# Patient Record
Sex: FEMALE | Race: ASIAN | NOT HISPANIC OR LATINO | URBAN - METROPOLITAN AREA
[De-identification: names, ages, dates, MRNs, and addresses within clinical notes are randomized per-mention and may not be internally consistent; named-entity substitution may affect disease eponyms.]

---

## 2019-11-18 ENCOUNTER — EMERGENCY (EMERGENCY)
Facility: HOSPITAL | Age: 84
LOS: 0 days | Discharge: HOME | End: 2019-11-18
Attending: EMERGENCY MEDICINE
Payer: MEDICAID

## 2019-11-18 VITALS
SYSTOLIC BLOOD PRESSURE: 166 MMHG | RESPIRATION RATE: 16 BRPM | TEMPERATURE: 98 F | HEART RATE: 72 BPM | DIASTOLIC BLOOD PRESSURE: 72 MMHG | OXYGEN SATURATION: 98 %

## 2019-11-18 VITALS — WEIGHT: 149.91 LBS | HEIGHT: 63 IN

## 2019-11-18 DIAGNOSIS — Z23 ENCOUNTER FOR IMMUNIZATION: ICD-10-CM

## 2019-11-18 DIAGNOSIS — Z79.01 LONG TERM (CURRENT) USE OF ANTICOAGULANTS: ICD-10-CM
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
## 2019-11-18 DIAGNOSIS — I65.29 OCCLUSION AND STENOSIS OF UNSPECIFIED CAROTID ARTERY: ICD-10-CM

## 2019-11-18 DIAGNOSIS — I10 ESSENTIAL (PRIMARY) HYPERTENSION: ICD-10-CM

## 2019-11-18 DIAGNOSIS — S01.81XA LACERATION WITHOUT FOREIGN BODY OF OTHER PART OF HEAD, INITIAL ENCOUNTER: ICD-10-CM
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
## 2019-11-18 DIAGNOSIS — S02.2XXA FRACTURE OF NASAL BONES, INITIAL ENCOUNTER FOR CLOSED FRACTURE: ICD-10-CM
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
## 2019-11-18 PROCEDURE — 76376 3D RENDER W/INTRP POSTPROCES: CPT | Mod: 26

## 2019-11-18 PROCEDURE — 12013 RPR F/E/E/N/L/M 2.6-5.0 CM: CPT

## 2019-11-18 PROCEDURE — 70486 CT MAXILLOFACIAL W/O DYE: CPT | Mod: 26

## 2019-11-18 PROCEDURE — 99284 EMERGENCY DEPT VISIT MOD MDM: CPT

## 2019-11-18 PROCEDURE — 93005 ELECTROCARDIOGRAM TRACING: CPT

## 2019-11-18 PROCEDURE — 99284 EMERGENCY DEPT VISIT MOD MDM: CPT | Mod: 25

## 2019-11-18 PROCEDURE — 70450 CT HEAD/BRAIN W/O DYE: CPT

## 2019-11-18 PROCEDURE — 93010 ELECTROCARDIOGRAM REPORT: CPT

## 2019-11-18 PROCEDURE — 70450 CT HEAD/BRAIN W/O DYE: CPT | Mod: 26

## 2019-11-18 PROCEDURE — 90471 IMMUNIZATION ADMIN: CPT

## 2019-11-18 PROCEDURE — 90715 TDAP VACCINE 7 YRS/> IM: CPT

## 2019-11-18 PROCEDURE — 76376 3D RENDER W/INTRP POSTPROCES: CPT

## 2019-11-18 PROCEDURE — 70486 CT MAXILLOFACIAL W/O DYE: CPT

## 2019-11-18 RX ORDER — ACETAMINOPHEN 500 MG
650 TABLET ORAL ONCE
Refills: 0 | Status: COMPLETED | OUTPATIENT
Start: 2019-11-18 | End: 2019-11-18

## 2019-11-18 RX ORDER — TETANUS TOXOID, REDUCED DIPHTHERIA TOXOID AND ACELLULAR PERTUSSIS VACCINE, ADSORBED 5; 2.5; 8; 8; 2.5 [IU]/.5ML; [IU]/.5ML; UG/.5ML; UG/.5ML; UG/.5ML
0.5 SUSPENSION INTRAMUSCULAR ONCE
Refills: 0 | Status: COMPLETED | OUTPATIENT
Start: 2019-11-18 | End: 2019-11-18

## 2019-11-18 RX ADMIN — Medication 650 MILLIGRAM(S): at 11:03

## 2019-11-18 RX ADMIN — TETANUS TOXOID, REDUCED DIPHTHERIA TOXOID AND ACELLULAR PERTUSSIS VACCINE, ADSORBED 0.5 MILLILITER(S): 5; 2.5; 8; 8; 2.5 SUSPENSION INTRAMUSCULAR at 11:03

## 2019-11-18 NOTE — ED PROVIDER NOTE - OBJECTIVE STATEMENT
83 y/o F with a PMHx of HTN, carotid artery stenosis presents to the ED s/p fall today. Pt was walking down the stairs when she lost her balance and fell down approximately 6 stairs. Pt hit her head. Pt now in ED with a laceration to her forehead, head pain, and nose pain. Denies LOC, leg pain, hip pain, or back pain. Pt is anticoagulated on Eliquis.

## 2019-11-18 NOTE — ED ADULT TRIAGE NOTE - CHIEF COMPLAINT QUOTE
Patient comes in with fall downstairs on eliquis. Patient has isolated head injury, forehead laceration and nose pain. Denies LOC. Patient denies pain in back/legs/hips. Neuro alert called. Patient brought straight to CT.

## 2019-11-18 NOTE — ED PROVIDER NOTE - CARE PLAN
Principal Discharge DX:	Head contusion  Secondary Diagnosis:	Facial laceration, initial encounter Principal Discharge DX:	Head contusion  Secondary Diagnosis:	Facial laceration, initial encounter  Secondary Diagnosis:	Nasal fracture

## 2019-11-18 NOTE — ED ADULT NURSE NOTE - OBJECTIVE STATEMENT
pt presents to ED s/p unwitnessed slip and fall down 5 stairs, + head trauma, - LOC, 2 lacerations to the forehead and 1 to the L. side nose. pt presents to ED s/p unwitnessed slip and fall down 5 stairs, + head trauma, - LOC, 2 lacerations to the forehead and 1 to the L. side nose. pt c/o HA, denies dizziness, SOB, and chest pain. Pt Aox3, breathing symmetrical and unlabored, family at bedside reports pt is at baseline mental status.

## 2019-11-18 NOTE — ED ADULT NURSE NOTE - NSIMPLEMENTINTERV_GEN_ALL_ED
Implemented All Fall with Harm Risk Interventions:  [place] to call system. Call bell, personal items and telephone within reach. Instruct patient to call for assistance. Room bathroom lighting operational. Non-slip footwear when patient is off stretcher. Physically safe environment: no spills, clutter or unnecessary equipment. Stretcher in lowest position, wheels locked, appropriate side rails in place. Provide visual cue, wrist band, yellow gown, etc. Monitor gait and stability. Monitor for mental status changes and reorient to person, place, and time. Review medications for side effects contributing to fall risk. Reinforce activity limits and safety measures with patient and family. Provide visual clues: red socks.

## 2019-11-18 NOTE — ED PROVIDER NOTE - PATIENT PORTAL LINK FT
You can access the FollowMyHealth Patient Portal offered by [facility] by registering at the following website: [email or web address]. By joining [employer]’s FollowMyHealth portal, you will also be able to view your health information using other applications (apps) compatible with our system.

## 2019-11-18 NOTE — ED PROVIDER NOTE - ENMT, MLM
Airway patent, Nasal mucosa clear. Mouth with normal mucosa. Throat has no vesicles, no oropharyngeal exudates and uvula is midline. Airway patent, Nasal mucosa clear. Mouth with normal mucosa. Throat has no vesicles, no oropharyngeal exudates and uvula is midline.  Nasal swelling/ecchymosis. No septal hematoma

## 2019-11-18 NOTE — ED PROVIDER NOTE - NSFOLLOWUPINSTRUCTIONS_ED_ALL_ED_FT
Head Injury, Adult    There are many types of head injuries. They can be as minor as a bump. Some head injuries can be worse. Worse injuries include:    A strong hit to the head that hurts the brain (concussion).  A bruise of the brain (contusion). This means there is bleeding in the brain that can cause swelling.  A cracked skull (skull fracture).  Bleeding in the brain that gathers, gets thick (makes a clot), and forms a bump (hematoma).    Most problems from a head injury come in the first 24 hours. However, you may still have side effects up to 7–10 days after your injury. It is important to watch your condition for any changes.     Follow these instructions at home:  Activity    Rest as much as possible.  Avoid activities that are hard or tiring.  Make sure you get enough sleep.  Limit activities that need a lot of thought or attention, such as:  Watching TV.  Playing memory games and puzzles.  Job-related work or homework.  Working on the computer, social media, and texting.  Avoid activities that could cause another head injury until your doctor says it is okay. This includes playing sports.  Ask your doctor when it is safe for you to go back to your normal activities, such as work or school. Ask your doctor for a step-by-step plan for slowly going back to your normal activities.  Ask your doctor when you can drive, ride a bicycle, or use heavy machinery. Never do these activities if you are dizzy.    Lifestyle    Do not drink alcohol until your doctor says it is okay.  Avoid drug use.  If it is harder than usual to remember things, write them down.  If you are easily distracted, try to do one thing at a time.  Talk with family members or close friends when making important decisions.  Tell your friends, family, a trusted coworker, and  about your injury, symptoms, and limits (restrictions). Have them watch for any problems that are new or getting worse.    General instructions    Take over-the-counter and prescription medicines only as told by your doctor.  Have someone stay with you for 24 hours after your head injury. This person should watch you for any changes in your symptoms and be ready to get help.  Keep all follow-up visits as told by your doctor. This is important.    How is this prevented?  Work on your balance and strength. This can help you avoid falls.  Wear a seatbelt when you are in a moving vehicle.  Wear a helmet when:  Riding a bicycle.  Skiing.  Doing any other sport or activity that has a risk of injury.  Drink alcohol only in moderation.  Make your home safer by:  Getting rid of clutter from the floors and stairs, like things that can make you trip.  Using grab bars in bathrooms and handrails by stairs.  Placing non-slip mats on floors and in bathtubs.  Putting more light in dim areas.    Get help right away if:  You have:  A very bad (severe) headache that is not helped by medicine.  Trouble walking or weakness in your arms and legs.  Clear or bloody fluid coming from your nose or ears.  Changes in your seeing (vision).  Jerky movements that you cannot control (seizure).  You throw up (vomit).  Your symptoms get worse.  You lose balance.  Your speech is slurred.  You pass out.  You are sleepier and have trouble staying awake.  The black centers of your eyes (pupils) change in size.    These symptoms may be an emergency. Do not wait to see if the symptoms will go away. Get medical help right away. Call your local emergency services (911 in the [place] Do not drive yourself to the hospital.    ADDITIONAL NOTES AND INSTRUCTIONS    Please follow up with your Primary MD in 24-48 hr.  Seek immediate medical care for any new/worsening signs or symptoms. Return to the ED or follow-up with your physician in ~7 days for suture removal.  Follow-up with Dr. Matos (Plastics) for your nasal bone fracture.    Stitches, Staples, or Adhesive Wound Closure    Doctors use stitches (sutures), staples, and certain glue (skin adhesives) to hold your skin together while it heals (wound closure). You may need this treatment after you have surgery or if you cut your skin accidentally. These methods help your skin heal more quickly. They also make it less likely that you will have a scar.    Sutures     Sutures are the oldest method of wound closure. Sutures can be made from natural or synthetic materials. They can be made from a material that your body can break down as your wound heals (absorbable), or they can be made from a material that needs to be removed from your skin (nonabsorbable). They come in many different strengths and sizes.    Your doctor attaches the sutures to a steel needle on one end. Sutures can be passed through your skin, or through the tissues beneath your skin. Then they are tied and cut. Your skin edges may be closed in one continuous stitch or in separate stitches.    Sutures are strong and can be used for all kinds of wounds. Absorbable sutures may be used to close tissues under the skin. The disadvantage of sutures is that they may cause skin reactions that lead to infection. Nonabsorbable sutures need to be removed.    How do I care for my wound closure?  Take medicines only as told by your doctor.  If you were prescribed an antibiotic medicine for your wound, finish it all even if you start to feel better.  Use ointments or creams only as told by your doctor.  Wash your hands with soap and water before and after touching your wound.  Do not soak your wound in water. Do not take baths, swim, or use a hot tub until your doctor says it is okay.  Ask your doctor when you can start showering. Cover your wound if told by your doctor.  Do not take out your own sutures or staples.  Do not pick at your wound. Picking can cause an infection.  Keep all follow-up visits as told by your doctor. This is important.    YOUR WOUND NEEDS FOLLOW UP FOR A WOUND CHECK, SUTURE REMOVAL OR STAPLE REMOVAL IN  7 DAYS    Contact your doctor if:    You have a fever.  You have chills.  You have redness, puffiness (swelling), or pain at the site of your wound.  You have fluid, blood, or pus coming from your wound.  There is a bad smell coming from your wound.  The skin edges of your wound start to separate after your sutures have been removed.  Your wound becomes thick, raised, and darker in color after your sutures come out (scarring).    This information is not intended to replace advice given to you by your health care provider. Make sure you discuss any questions you have with your health care provider.         Head Injury, Adult    There are many types of head injuries. They can be as minor as a bump. Some head injuries can be worse. Worse injuries include:    A strong hit to the head that hurts the brain (concussion).  A bruise of the brain (contusion). This means there is bleeding in the brain that can cause swelling.  A cracked skull (skull fracture).  Bleeding in the brain that gathers, gets thick (makes a clot), and forms a bump (hematoma).    Most problems from a head injury come in the first 24 hours. However, you may still have side effects up to 7–10 days after your injury. It is important to watch your condition for any changes.     Follow these instructions at home:  Activity    Rest as much as possible.  Avoid activities that are hard or tiring.  Make sure you get enough sleep.  Limit activities that need a lot of thought or attention, such as:  Watching TV.  Playing memory games and puzzles.  Job-related work or homework.  Working on the computer, social media, and texting.  Avoid activities that could cause another head injury until your doctor says it is okay. This includes playing sports.  Ask your doctor when it is safe for you to go back to your normal activities, such as work or school. Ask your doctor for a step-by-step plan for slowly going back to your normal activities.  Ask your doctor when you can drive, ride a bicycle, or use heavy machinery. Never do these activities if you are dizzy.    Lifestyle    Do not drink alcohol until your doctor says it is okay.  Avoid drug use.  If it is harder than usual to remember things, write them down.  If you are easily distracted, try to do one thing at a time.  Talk with family members or close friends when making important decisions.  Tell your friends, family, a trusted coworker, and  about your injury, symptoms, and limits (restrictions). Have them watch for any problems that are new or getting worse.    General instructions    Take over-the-counter and prescription medicines only as told by your doctor.  Have someone stay with you for 24 hours after your head injury. This person should watch you for any changes in your symptoms and be ready to get help.  Keep all follow-up visits as told by your doctor. This is important.    How is this prevented?  Work on your balance and strength. This can help you avoid falls.  Wear a seatbelt when you are in a moving vehicle.  Wear a helmet when:  Riding a bicycle.  Skiing.  Doing any other sport or activity that has a risk of injury.  Drink alcohol only in moderation.  Make your home safer by:  Getting rid of clutter from the floors and stairs, like things that can make you trip.  Using grab bars in bathrooms and handrails by stairs.  Placing non-slip mats on floors and in bathtubs.  Putting more light in dim areas.    Get help right away if:  You have:  A very bad (severe) headache that is not helped by medicine.  Trouble walking or weakness in your arms and legs.  Clear or bloody fluid coming from your nose or ears.  Changes in your seeing (vision).  Jerky movements that you cannot control (seizure).  You throw up (vomit).  Your symptoms get worse.  You lose balance.  Your speech is slurred.  You pass out.  You are sleepier and have trouble staying awake.  The black centers of your eyes (pupils) change in size.    These symptoms may be an emergency. Do not wait to see if the symptoms will go away. Get medical help right away. Call your local emergency services (911 in the [place] Do not drive yourself to the hospital.    ADDITIONAL NOTES AND INSTRUCTIONS    Please follow up with your Primary MD in 24-48 hr.  Seek immediate medical care for any new/worsening signs or symptoms.

## 2019-11-18 NOTE — ED PROVIDER NOTE - CARE PROVIDER_API CALL
Aniket Humphrey)  Plastic Surgery  [address], [place], [address], [place] [address]  Phone: [phone]  Fax: [phone]  Follow Up Time:

## 2022-07-15 ENCOUNTER — EMERGENCY (EMERGENCY)
Facility: HOSPITAL | Age: 87
LOS: 0 days | Discharge: HOME | End: 2022-07-15
Attending: EMERGENCY MEDICINE
Payer: MEDICAID

## 2022-07-15 VITALS
RESPIRATION RATE: 19 BRPM | SYSTOLIC BLOOD PRESSURE: 119 MMHG | TEMPERATURE: 98 F | OXYGEN SATURATION: 99 % | DIASTOLIC BLOOD PRESSURE: 77 MMHG | HEART RATE: 71 BPM

## 2022-07-15 VITALS — OXYGEN SATURATION: 96 % | HEART RATE: 77 BPM | HEIGHT: 63 IN | WEIGHT: 115.08 LBS

## 2022-07-15 DIAGNOSIS — I10 ESSENTIAL (PRIMARY) HYPERTENSION: ICD-10-CM

## 2022-07-15 DIAGNOSIS — U07.1 COVID-19: ICD-10-CM

## 2022-07-15 DIAGNOSIS — R06.2 WHEEZING: ICD-10-CM

## 2022-07-15 DIAGNOSIS — R06.02 SHORTNESS OF BREATH: ICD-10-CM

## 2022-07-15 DIAGNOSIS — R05.9 COUGH, UNSPECIFIED: ICD-10-CM

## 2022-07-15 PROCEDURE — 93005 ELECTROCARDIOGRAM TRACING: CPT

## 2022-07-15 PROCEDURE — 99283 EMERGENCY DEPT VISIT LOW MDM: CPT | Mod: 25

## 2022-07-15 PROCEDURE — 99284 EMERGENCY DEPT VISIT MOD MDM: CPT

## 2022-07-15 PROCEDURE — 93010 ELECTROCARDIOGRAM REPORT: CPT

## 2022-07-15 PROCEDURE — 71045 X-RAY EXAM CHEST 1 VIEW: CPT

## 2022-07-15 PROCEDURE — 71045 X-RAY EXAM CHEST 1 VIEW: CPT | Mod: 26

## 2022-07-15 RX ORDER — ACETAMINOPHEN 500 MG
650 TABLET ORAL ONCE
Refills: 0 | Status: COMPLETED | OUTPATIENT
Start: 2022-07-15 | End: 2022-07-15

## 2022-07-15 NOTE — ED ADULT NURSE NOTE - OBJECTIVE STATEMENT
Pt reports feeling short of breath and having wheezing at home  has reported being covid positive since a few days ago

## 2022-07-15 NOTE — ED PROVIDER NOTE - NSFOLLOWUPINSTRUCTIONS_ED_ALL_ED_FT
please follow up with your doctor in 2-3 days.   continue with medication as prescribed.   return to ED for any concerns please follow up with your doctor in 2-3 days.   continue with medication as prescribed.   take tylenol every 4 hours for pain.  drink plenty of fluids.  return to ED for any concerns

## 2022-07-15 NOTE — ED ADULT NURSE NOTE - PAIN RATING/NUMBER SCALE (0-10): ACTIVITY
7 Cheek-To-Nose Interpolation Flap Text: A decision was made to reconstruct the defect utilizing an interpolation axial flap and a staged reconstruction.  A telfa template was made of the defect.  This telfa template was then used to outline the Cheek-To-Nose Interpolation flap.  The donor area for the pedicle flap was then injected with anesthesia.  The flap was excised through the skin and subcutaneous tissue down to the layer of the underlying musculature.  The interpolation flap was carefully excised within this deep plane to maintain its blood supply.  The edges of the donor site were undermined.   The donor site was closed in a primary fashion.  The pedicle was then rotated into position and sutured.  Once the tube was sutured into place, adequate blood supply was confirmed with blanching and refill.  The pedicle was then wrapped with xeroform gauze and dressed appropriately with a telfa and gauze bandage to ensure continued blood supply and protect the attached pedicle.

## 2022-07-15 NOTE — ED PROVIDER NOTE - PROGRESS NOTE DETAILS
Vaccine status unknown pt and daughter in law told of results.   agree with plan for d/c home to continue with antiviral, tylenol for pain and f/u with PMD

## 2022-07-15 NOTE — ED PROVIDER NOTE - OBJECTIVE STATEMENT
88 y/o female in ED c/o cough with sob x 1 wk diagnosed with COVID and on antiviral meds.   states today with wheezing.   denies any fever, HA, cp, n/v/d/abd pain.

## 2022-07-15 NOTE — ED ADULT TRIAGE NOTE - CHIEF COMPLAINT QUOTE
Patient in the beginning stage of dementia. Pt. presenting to the ER accompanied by her daughter-in-law with c/o wheezing. As per Lasha (daughter-in-law) "patient tested positive for COVID last Friday at urgent care and was placed on antivirals. On Thursday she began wheezing. This morning the wheezing got worse so we brought her in to be evaluated. Complains of pain in her head and chest." Respirations even and unlabored, air way patent, O2 saturation 96% on room air, heart rate 77, no visible signs of distress noted. Brought to the room for EKG.

## 2022-07-15 NOTE — ED PROVIDER NOTE - PATIENT PORTAL LINK FT
You can access the FollowMyHealth Patient Portal offered by [facility] by registering at the following website: [email or web address]. By joining [employer]’s FollowMyHealth portal, you will also be able to view your health information using other applications (apps) compatible with our system.

## 2022-07-15 NOTE — ED PROVIDER NOTE - CLINICAL SUMMARY MEDICAL DECISION MAKING FREE TEXT BOX
pt with covid on antiviral c/o wheezing today.   lungs CTA.   no distress.   will XR, med and have f/u

## 2023-04-01 ENCOUNTER — EMERGENCY (EMERGENCY)
Facility: HOSPITAL | Age: 88
LOS: 0 days | Discharge: HOME | End: 2023-04-01
Attending: EMERGENCY MEDICINE
Payer: MEDICAID

## 2023-04-01 VITALS
SYSTOLIC BLOOD PRESSURE: 177 MMHG | RESPIRATION RATE: 19 BRPM | DIASTOLIC BLOOD PRESSURE: 56 MMHG | OXYGEN SATURATION: 100 % | HEART RATE: 80 BPM | TEMPERATURE: 99 F

## 2023-04-01 VITALS — WEIGHT: 108.03 LBS | HEIGHT: 55 IN
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
## 2023-04-01 DIAGNOSIS — S61.213A LACERATION WITHOUT FOREIGN BODY OF LEFT MIDDLE FINGER WITHOUT DAMAGE TO NAIL, INITIAL ENCOUNTER: ICD-10-CM
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
## 2023-04-01 DIAGNOSIS — Z86.79 PERSONAL HISTORY OF OTHER DISEASES OF THE CIRCULATORY SYSTEM: ICD-10-CM

## 2023-04-01 DIAGNOSIS — I10 ESSENTIAL (PRIMARY) HYPERTENSION: ICD-10-CM

## 2023-04-01 DIAGNOSIS — Z79.02 LONG TERM (CURRENT) USE OF ANTITHROMBOTICS/ANTIPLATELETS: ICD-10-CM

## 2023-04-01 PROCEDURE — 99282 EMERGENCY DEPT VISIT SF MDM: CPT

## 2023-04-01 PROCEDURE — 99284 EMERGENCY DEPT VISIT MOD MDM: CPT

## 2023-04-01 RX ORDER — CEPHALEXIN 500 MG
1 CAPSULE ORAL
Qty: 10 | Refills: 0
Start: 2023-04-01 | End: 2023-04-05

## 2023-04-01 RX ORDER — ALBUTEROL 90 UG/1
3 AEROSOL, METERED ORAL
Qty: 9 | Refills: 0
Start: 2023-04-01 | End: 2023-04-05

## 2023-04-01 NOTE — ED ADULT NURSE NOTE - NS ED NURSE DC INFO COMPLEXITY
Simple: Patient demonstrates quick and easy understanding/Verbalized Understanding
syncope, chest pressure

## 2023-04-01 NOTE — ED STATDOCS - NS ED ATTENDING STATEMENT MOD
Attending Only This was a shared visit with the RENZO. I reviewed and verified the documentation and independently performed the documented:

## 2023-04-01 NOTE — ED STATDOCS - PROGRESS NOTE DETAILS
patient seen and evaluated with ED attending at intake.  bleeding controlled.  wound care reviewed -Shemar Shaw PA-C

## 2023-04-01 NOTE — ED STATDOCS - ATTENDING APP SHARED VISIT CONTRIBUTION OF CARE
Dr. Laird: I performed a face to face bedside interview with patient regarding history of present illness, review of symptoms and past medical history. I completed an independent physical exam.  I have discussed patient's plan of care with PA.   I agree with note as stated above, having amended the EMR as needed to reflect my findings.   This includes HISTORY OF PRESENT ILLNESS, HIV, PAST MEDICAL/SURGICAL/FAMILY/SOCIAL HISTORY, ALLERGIES AND HOME MEDICATIONS, REVIEW OF SYSTEMS, PHYSICAL EXAM, and any PROGRESS NOTES during the time I functioned as the attending physician for this patient.  KACY Laird DO

## 2023-04-01 NOTE — ED STATDOCS - CLINICAL SUMMARY MEDICAL DECISION MAKING FREE TEXT BOX
pt with avulsion of finger stillbleeding will cleanse, apply silver nitrate to stop bleeding and drethan d/c

## 2023-04-01 NOTE — ED STATDOCS - NSFOLLOWUPINSTRUCTIONS_ED_ALL_ED_FT
Deep Skin Avulsion  A deep skin avulsion is a type of open wound. It often results from a severe injury (trauma) that tears away all layers of the skin or an entire body part. The areas of the body that are most often affected include the face, lips, ears, nose, and fingers.    A deep skin avulsion may make structures below the skin become visible. You may be able to see muscle, bone, nerves, and blood vessels. A deep skin avulsion can also damage important structures beneath the skin. These include bones, tendons, nerves, or blood vessels.    What are the causes?  This condition may be caused by an injury, such as:  Being crushed.  Falling against a jagged surface.  An animal bite.  A gunshot wound.  A severe burn.  Being dragged, such as in a bicycle or motorcycle accident.  What are the signs or symptoms?  Symptoms of this condition include:  Pain.  Numbness.  Swelling.  Bleeding, which may be heavy.  How is this diagnosed?  This condition may be diagnosed with a medical history and physical exam. You may also have X-rays done.    How is this treated?  Treatment for this condition depends on how large and deep the wound is and where it is located. Treatment usually starts with:  Stopping the bleeding.  Washing out the wound with a germ-free (sterile) solution.  After initial treatment, the wound may be closed or left open to heal.  Wounds that are small and clean may be closed with stitches (sutures).  Wounds that cannot be closed with sutures may be covered with a piece of skin (graft) or your own skin flap.  Wounds that are hard to close or that may become infected may be left open. These wounds heal over time.  You may also be treated with medicine, such as:  Antibiotic medicine.  Pain medicine.  Tetanus shot.  Follow these instructions at home:  Medicines    If you were prescribed an antibiotic medicine, take or apply it as told by your health care provider. Do not stop taking or using the antibiotic even if your condition improves.  Take over-the-counter and prescription medicines only as told by your health care provider.  If you were prescribed a pain medicine, take it 30 minutes or more before you do any wound care, or as told by your health care provider.  Ask your health care provider if the medicine prescribed to you requires you to avoid driving or using machinery.  Wound care      Follow instructions from your health care provider about how to take care of your wound. Make sure you:  Wash your hands with soap and water for at least 20 seconds before and after you change your bandage (dressing). If soap and water are not available, use hand .  Change your dressing as told by your health care provider.  Leave sutures, skin glue, or adhesive strips in place. These skin closures may need to stay in place for 2 weeks or longer. If adhesive strip edges start to loosen and curl up, you may trim the loose edges. Do not remove adhesive strips completely unless your health care provider tells you to do that.  Clean the wound each day, or as told by your health care provider:  Wash the wound with mild soap and water.  Rinse the wound with water to remove all soap.  Pat the wound dry with a clean towel. Do not rub it.  Cover the wound with a clean dressing.  Keep your dressing clean and dry. Do not take baths, swim, use a hot tub, or do anything that would put your wound under water until your health care provider approves.  Check your wound every day for signs of infection. Check for:  More redness, swelling, or pain.  More fluid or blood.  Warmth.  Pus or a bad smell.  Do not scratch or pick at the wound.  General instructions    Raise (elevate) the injured area above the level of your heart while you are sitting or lying down.  Do not use any products that contain nicotine or tobacco, such as cigarettes, e-cigarettes, and chewing tobacco. These may delay wound healing. If you need help quitting, ask your health care provider.  Eat a healthy diet to help your wound heal. This includes eating foods rich in protein, vitamin A, and vitamin C.  Keep all follow-up visits. This is important.  Contact a health care provider if:  You have pain that does not get better with medicine.  You have any of these signs of infection:  More redness, swelling, or pain around your wound.  More fluid or blood coming from your wound.  A fever.  You got a tetanus shot and you have swelling, severe pain, redness, or bleeding at the injection site.  You are nauseous or you vomit.  You notice something coming out of the wound, such as wood or glass.  Get help right away if:  You have a red streak going away from your wound.  A wound that was closed breaks open.  The wound is bleeding, and the bleeding does not stop with gentle pressure.  You have trouble breathing.  The wound is on your hand or foot and:  You cannot properly move a finger or toe.  Your fingers or toes look pale or bluish.  Summary  A deep skin avulsion is a type of injury that can damage important structures beneath the skin.  A wound may be closed or left open to heal. This depends on the size and location of the wound and whether it is likely to become infected.  Follow instructions from your health care provider about how to take care of your wound.  Contact your health care provider if you have increased redness, swelling, or pain around your wound, or if your pain is not controlled with medicine.  This information is not intended to replace advice given to you by your health care provider. Make sure you discuss any questions you have with your health care provider.

## 2023-04-01 NOTE — ED ADULT TRIAGE NOTE - CHIEF COMPLAINT QUOTE
pt presents to ED c/o finger laceration. pt daughter states pt cut finger on Thursday with knife. was seen at urgent care and wrapped in gauze. today went back to urgent care to have dressing loosened up because it was "too tight" and cut started bleeding again. active bleeding noted in triage. pt takes daily  Plavix

## 2023-04-01 NOTE — ED PROCEDURE NOTE - GENERAL PROCEDURE DETAILS
silver nitrate utilized to control bleeding to finger tip, quick clot and pressure dressing subsequently applied

## 2023-04-01 NOTE — ED STATDOCS - PATIENT PORTAL LINK FT
You can access the FollowMyHealth Patient Portal offered by [facility] by registering at the following website: [email or web address]. By joining [facility]’s FollowMyHealth portal, you will also be able to view your health information using other applications (apps) compatible with our system.

## 2023-04-01 NOTE — ED STATDOCS - OBJECTIVE STATEMENT
87 yo female bib daughter for left middle finger laceration from cutting an onion that was seen at urgent care and dressed. the dressing is saturated with blood and laceration is still bleeding, pt is on plavix

## 2023-04-01 NOTE — ED STATDOCS - PHYSICAL EXAMINATION
Gen:  Well appearning in NAD  Head:  NC/AT  Resp: No distress   Ext: no deformities  Skin: warm and dry as visualized, left middle finger, distal volar, radial side with approx 1cm  avulsion with scant oozing from. sensation intact  psych:  aao x 4,

## 2023-04-20 ENCOUNTER — INPATIENT (INPATIENT)
Facility: HOSPITAL | Age: 88
LOS: 1 days | Discharge: HOME | DRG: 194 | End: 2023-04-22
Attending: HOSPITALIST | Admitting: INTERNAL MEDICINE
Payer: MEDICAID

## 2023-04-20 VITALS
DIASTOLIC BLOOD PRESSURE: 51 MMHG | OXYGEN SATURATION: 97 % | WEIGHT: 108.03 LBS | SYSTOLIC BLOOD PRESSURE: 146 MMHG | RESPIRATION RATE: 17 BRPM | TEMPERATURE: 98 F | HEART RATE: 64 BPM | HEIGHT: 55 IN

## 2023-04-20 DIAGNOSIS — I50.9 HEART FAILURE, UNSPECIFIED: ICD-10-CM

## 2023-04-20 LAB
ALBUMIN SERPL ELPH-MCNC: 3.5 G/DL — SIGNIFICANT CHANGE UP (ref 3.3–5)
ALP SERPL-CCNC: 76 U/L — SIGNIFICANT CHANGE UP (ref 40–120)
ALT FLD-CCNC: 32 U/L — SIGNIFICANT CHANGE UP (ref 12–78)
ANION GAP SERPL CALC-SCNC: 6 MMOL/L — SIGNIFICANT CHANGE UP (ref 5–17)
APPEARANCE UR: CLEAR — SIGNIFICANT CHANGE UP
APTT BLD: 24.6 SEC — LOW (ref 27.5–35.5)
AST SERPL-CCNC: 39 U/L — HIGH (ref 15–37)
BASOPHILS # BLD AUTO: 0.02 K/UL — SIGNIFICANT CHANGE UP (ref 0–0.2)
BASOPHILS NFR BLD AUTO: 0.2 % — SIGNIFICANT CHANGE UP (ref 0–2)
BILIRUB SERPL-MCNC: 0.4 MG/DL — SIGNIFICANT CHANGE UP (ref 0.2–1.2)
BILIRUB UR-MCNC: NEGATIVE — SIGNIFICANT CHANGE UP
BUN SERPL-MCNC: 81 MG/DL — HIGH (ref 7–23)
CALCIUM SERPL-MCNC: 8.7 MG/DL — SIGNIFICANT CHANGE UP (ref 8.5–10.1)
CHLORIDE SERPL-SCNC: 99 MMOL/L — SIGNIFICANT CHANGE UP (ref 96–108)
CO2 SERPL-SCNC: 22 MMOL/L — SIGNIFICANT CHANGE UP (ref 22–31)
COLOR SPEC: YELLOW — SIGNIFICANT CHANGE UP
CREAT SERPL-MCNC: 1.95 MG/DL — HIGH (ref 0.5–1.3)
DIFF PNL FLD: ABNORMAL
EGFR: 24 ML/MIN/1.73M2 — LOW
EOSINOPHIL # BLD AUTO: 0.02 K/UL — SIGNIFICANT CHANGE UP (ref 0–0.5)
EOSINOPHIL NFR BLD AUTO: 0.2 % — SIGNIFICANT CHANGE UP (ref 0–6)
ERYTHROCYTE [SEDIMENTATION RATE] IN BLOOD: 72 MM/HR — HIGH (ref 0–20)
GLUCOSE SERPL-MCNC: 115 MG/DL — HIGH (ref 70–99)
GLUCOSE UR QL: NEGATIVE — SIGNIFICANT CHANGE UP
HCT VFR BLD CALC: 24.8 % — LOW (ref 34.5–45)
HGB BLD-MCNC: 8.2 G/DL — LOW (ref 11.5–15.5)
IMM GRANULOCYTES NFR BLD AUTO: 0.4 % — SIGNIFICANT CHANGE UP (ref 0–0.9)
INR BLD: 1.03 RATIO — SIGNIFICANT CHANGE UP (ref 0.88–1.16)
KETONES UR-MCNC: NEGATIVE — SIGNIFICANT CHANGE UP
LACTATE SERPL-SCNC: 1 MMOL/L — SIGNIFICANT CHANGE UP (ref 0.7–2)
LEUKOCYTE ESTERASE UR-ACNC: ABNORMAL
LYMPHOCYTES # BLD AUTO: 1.05 K/UL — SIGNIFICANT CHANGE UP (ref 1–3.3)
LYMPHOCYTES # BLD AUTO: 10.1 % — LOW (ref 13–44)
MAGNESIUM SERPL-MCNC: 2.2 MG/DL — SIGNIFICANT CHANGE UP (ref 1.6–2.6)
MCHC RBC-ENTMCNC: 26.5 PG — LOW (ref 27–34)
MCHC RBC-ENTMCNC: 33.1 GM/DL — SIGNIFICANT CHANGE UP (ref 32–36)
MCV RBC AUTO: 80.3 FL — SIGNIFICANT CHANGE UP (ref 80–100)
MONOCYTES # BLD AUTO: 0.8 K/UL — SIGNIFICANT CHANGE UP (ref 0–0.9)
MONOCYTES NFR BLD AUTO: 7.7 % — SIGNIFICANT CHANGE UP (ref 2–14)
NEUTROPHILS # BLD AUTO: 8.49 K/UL — HIGH (ref 1.8–7.4)
NEUTROPHILS NFR BLD AUTO: 81.4 % — HIGH (ref 43–77)
NITRITE UR-MCNC: NEGATIVE — SIGNIFICANT CHANGE UP
NT-PROBNP SERPL-SCNC: HIGH PG/ML (ref 0–450)
PH UR: 5 — SIGNIFICANT CHANGE UP (ref 5–8)
PLATELET # BLD AUTO: 233 K/UL — SIGNIFICANT CHANGE UP (ref 150–400)
POTASSIUM SERPL-MCNC: 5.1 MMOL/L — SIGNIFICANT CHANGE UP (ref 3.5–5.3)
POTASSIUM SERPL-SCNC: 5.1 MMOL/L — SIGNIFICANT CHANGE UP (ref 3.5–5.3)
PROT SERPL-MCNC: 8.3 GM/DL — SIGNIFICANT CHANGE UP (ref 6–8.3)
PROT UR-MCNC: 15
PROTHROM AB SERPL-ACNC: 11.9 SEC — SIGNIFICANT CHANGE UP (ref 10.5–13.4)
RAPID RVP RESULT: SIGNIFICANT CHANGE UP
RBC # BLD: 3.09 M/UL — LOW (ref 3.8–5.2)
RBC # FLD: 14.8 % — HIGH (ref 10.3–14.5)
SARS-COV-2 RNA SPEC QL NAA+PROBE: SIGNIFICANT CHANGE UP
SODIUM SERPL-SCNC: 127 MMOL/L — LOW (ref 135–145)
SP GR SPEC: 1.01 — SIGNIFICANT CHANGE UP (ref 1.01–1.02)
TROPONIN I, HIGH SENSITIVITY RESULT: 35.84 NG/L — SIGNIFICANT CHANGE UP
UROBILINOGEN FLD QL: NEGATIVE — SIGNIFICANT CHANGE UP
WBC # BLD: 10.42 K/UL — SIGNIFICANT CHANGE UP (ref 3.8–10.5)
WBC # FLD AUTO: 10.42 K/UL — SIGNIFICANT CHANGE UP (ref 3.8–10.5)

## 2023-04-20 PROCEDURE — 36415 COLL VENOUS BLD VENIPUNCTURE: CPT

## 2023-04-20 PROCEDURE — 99285 EMERGENCY DEPT VISIT HI MDM: CPT

## 2023-04-20 PROCEDURE — 86850 RBC ANTIBODY SCREEN: CPT

## 2023-04-20 PROCEDURE — 86900 BLOOD TYPING SEROLOGIC ABO: CPT

## 2023-04-20 PROCEDURE — 71045 X-RAY EXAM CHEST 1 VIEW: CPT | Mod: 26

## 2023-04-20 PROCEDURE — 93010 ELECTROCARDIOGRAM REPORT: CPT

## 2023-04-20 PROCEDURE — 85027 COMPLETE CBC AUTOMATED: CPT

## 2023-04-20 PROCEDURE — 86901 BLOOD TYPING SEROLOGIC RH(D): CPT

## 2023-04-20 PROCEDURE — 73130 X-RAY EXAM OF HAND: CPT | Mod: 26,LT

## 2023-04-20 PROCEDURE — 80048 BASIC METABOLIC PNL TOTAL CA: CPT

## 2023-04-20 PROCEDURE — 85652 RBC SED RATE AUTOMATED: CPT

## 2023-04-20 RX ORDER — FUROSEMIDE 40 MG
20 TABLET ORAL ONCE
Refills: 0 | Status: COMPLETED | OUTPATIENT
Start: 2023-04-20 | End: 2023-04-20

## 2023-04-20 RX ADMIN — Medication 20 MILLIGRAM(S): at 22:11

## 2023-04-20 NOTE — CONSULT NOTE ADULT - SUBJECTIVE AND OBJECTIVE BOX
88y Female RHD presents with necrosis of the LMF distal phalanx. Patient accompanied by daughter who assisted with interview. She states pt cut her finger on  while chopping onions. They went to urgent care after because the cut kept bleeding since the patient is on plavix. At urgent care they wrapped her finger tightly to stop the bleeding, however, the following day she noticed it was purple so she went back to urgent care on  where they undressed the finger and wrapped it more loosely. Afterwards the finger kept bleeding so she came to  ED for evaluation. Notably the  ED visit is documented on , however daughter states she was here on . The ED cauterized the finger with silver nitrate and patient was discharged. Several days ago the daughter noticed the finger was turning black so she came back to ED for further evaluation. Patient has minimal pain and states there is no sensation in the fingertip of the affected finger.       PAST MEDICAL & SURGICAL HISTORY:  HTN (hypertension)      History of carotid stenosis        Home Medications:    Allergies    Allergy Status Unknown    Intolerances                              8.2    10.42 )-----------( 233      ( 2023 21:00 )             24.8     04-20    127<L>  |  99  |  81<H>  ----------------------------<  115<H>  5.1   |  22  |  1.95<H>    Ca    8.7      2023 21:00  Mg     2.2     04-20    TPro  8.3  /  Alb  3.5  /  TBili  0.4  /  DBili  x   /  AST  39<H>  /  ALT  32  /  AlkPhos  76  04-20    PT/INR - ( 2023 21:00 )   PT: 11.9 sec;   INR: 1.03 ratio         PTT - ( 2023 21:00 )  PTT:24.6 sec  Urinalysis Basic - ( 2023 21:55 )    Color: Yellow / Appearance: Clear / S.010 / pH: x  Gluc: x / Ketone: Negative  / Bili: Negative / Urobili: Negative   Blood: x / Protein: 15 / Nitrite: Negative   Leuk Esterase: Small / RBC: 3-5 /HPF / WBC 26-50 /HPF   Sq Epi: x / Non Sq Epi: x / Bacteria: Occasional          Vital Signs Last 24 Hrs  T(C): 36.8 (2023 19:56), Max: 36.8 (2023 19:56)  T(F): 98.2 (2023 19:56), Max: 98.2 (2023 19:56)  HR: 64 (2023 19:56) (64 - 64)  BP: 146/51 (2023 19:56) (146/51 - 146/51)  BP(mean): 103 (2023 19:56) (103 - 103)  RR: 17 (2023 19:56) (17 - 17)  SpO2: 97% (2023 19:56) (97% - 97%)    Parameters below as of 2023 19:56  Patient On (Oxygen Delivery Method): room air        PHYSICAL EXAM  General: NAD, Awake and Alert  L hand  necrotic eschar over left middle finger distal phalanx  no sensation at fingertip over eschar  SILT proximal to necrotic site  Able to make full fist and extend fingers  AIN/PIN/U intact  cap refill <2 sec proximal to necrotic site      IMAGING:  XR L hand: no acute fracture or dislocation, pending official read    Assessment/Plan:  88y Female with necrosis of LMF distal phalanx    -local wound care to be performed by ortho team  -will require surgical amputation which is nonurgent at this time  -dressed with non-circumferential xeroform, gauze, ava  -FU BCx, ESR/CRP  -Pain control as needed  -DVT ppx per primary team  -WBAT L hand  -discussed with Dr. Espinoza who agrees with plan   88y Female RHD presents with necrosis of the LMF distal phalanx. Patient accompanied by daughter who assisted with interview. She states pt cut her finger on  while chopping onions. They went to urgent care after because the cut kept bleeding since the patient is on plavix. At urgent care they wrapped her finger tightly to stop the bleeding, however, the following day she noticed it was purple so she went back to urgent care on  where they undressed the finger and wrapped it more loosely. Afterwards the finger kept bleeding so she came to  ED for evaluation. Notably the  ED visit is documented on , however daughter states she was here on . The ED cauterized the finger with silver nitrate and patient was discharged. Several days ago the daughter noticed the finger was turning black so she came back to ED for further evaluation. Patient has minimal pain and states there is no sensation in the fingertip of the affected finger.       PAST MEDICAL & SURGICAL HISTORY:  HTN (hypertension)      History of carotid stenosis        Home Medications:    Allergies    Allergy Status Unknown    Intolerances                              8.2    10.42 )-----------( 233      ( 2023 21:00 )             24.8     04-20    127<L>  |  99  |  81<H>  ----------------------------<  115<H>  5.1   |  22  |  1.95<H>    Ca    8.7      2023 21:00  Mg     2.2     04-20    TPro  8.3  /  Alb  3.5  /  TBili  0.4  /  DBili  x   /  AST  39<H>  /  ALT  32  /  AlkPhos  76  04-20    PT/INR - ( 2023 21:00 )   PT: 11.9 sec;   INR: 1.03 ratio         PTT - ( 2023 21:00 )  PTT:24.6 sec  Urinalysis Basic - ( 2023 21:55 )    Color: Yellow / Appearance: Clear / S.010 / pH: x  Gluc: x / Ketone: Negative  / Bili: Negative / Urobili: Negative   Blood: x / Protein: 15 / Nitrite: Negative   Leuk Esterase: Small / RBC: 3-5 /HPF / WBC 26-50 /HPF   Sq Epi: x / Non Sq Epi: x / Bacteria: Occasional          Vital Signs Last 24 Hrs  T(C): 36.8 (2023 19:56), Max: 36.8 (2023 19:56)  T(F): 98.2 (2023 19:56), Max: 98.2 (2023 19:56)  HR: 64 (2023 19:56) (64 - 64)  BP: 146/51 (2023 19:56) (146/51 - 146/51)  BP(mean): 103 (2023 19:56) (103 - 103)  RR: 17 (2023 19:56) (17 - 17)  SpO2: 97% (2023 19:56) (97% - 97%)    Parameters below as of 2023 19:56  Patient On (Oxygen Delivery Method): room air        PHYSICAL EXAM  General: NAD, Awake and Alert  L hand  necrotic eschar over left middle finger distal phalanx  no sensation at fingertip over eschar  SILT proximal to necrotic site  Able to make full fist and extend fingers  AIN/PIN/U intact  cap refill <2 sec proximal to necrotic site      IMAGING:  XR L hand: no acute fracture or dislocation, pending official read    Assessment/Plan:  88y Female with necrosis of LMF distal phalanx    -local wound care to be performed by ortho team  -will require surgical amputation which is nonurgent at this time  -FU medical optimization, please document  -dressed with non-circumferential xeroform, gauze, ava  -FU BCx, ESR/CRP  -Pain control as needed  -DVT ppx per primary team  -WBAT L hand  -discussed with Dr. Espinoza who agrees with plan

## 2023-04-20 NOTE — ED STATDOCS - OBJECTIVE STATEMENT
88 year old female with hx of CHF on lasix, carotid stenosis on plavix presents to the ED c/o chest pain, SOB, exertional dyspnea began yesterday, worsening today. Daughter reports that x1 month ago patient went to  after cutting finger and had it tightly wrapped with tourniquet for x2 days, then came to ED 4/1 to have bleeding controlled and daughter noticed necrosis of finger days after. 88 year old female with hx of CHF on lasix, carotid stenosis on plavix presents to the ED c/o chest pain, SOB, exertional dyspnea began yesterday, worsening today. +crackles. Daughter also reports that x1 month ago patient went to  after cutting finger and had it tightly wrapped with tourniquet for x2 days, then came to ED 4/1 to have bleeding controlled and daughter noticed necrosis of finger days after.

## 2023-04-20 NOTE — ED STATDOCS - NS_ ATTENDINGSCRIBEDETAILS _ED_A_ED_FT
I, Ian Lucas MD,  performed the initial face to face bedside interview with this patient regarding history of present illness, review of symptoms and relevant past medical, social and family history.  I completed an independent physical examination.  I was the initial provider who evaluated this patient. I have signed out the follow up of any pending tests (i.e. labs, radiological studies) to the RENZO.  I have communicated the patient’s plan of care and disposition with the RENZO.  The history, relevant review of systems, past medical and surgical history, medical decision making, and physical examination was documented by the scribe in my presence and I attest to the accuracy of the documentation.

## 2023-04-20 NOTE — ED STATDOCS - PROGRESS NOTE DETAILS
LEN Tariq: 88 year old female with hx of CHF, carotid stenosis on plavix presents to the ED c/o SOB on exertion yesterday, and now today SOB while just lying down as well.  Pt also reports on about 1 month ago pt went to  after cutting finger with knife while cutting an onion and had it tightly wrapped for x2 days, then came to ED 4/1 to have bleeding controlled and daughter noticed necrosis of finger days after. on exam: crackles noted, +L 3rd finger distal phalanx with dry gangrene mild swelling of middle phalanx   plan : EKG, labs, hand c/s and admit to medicine.   Will continue to monitor. LEN Tariq: Dr. Espinoza ([facility]) called for consult. Resident will see pt at bedside in ED. LEN Tariq: Labs  and imaging reviewed. d/w hospitalists and accepted pt for admission. family and pt agree with plan.

## 2023-04-20 NOTE — ED ADULT NURSE NOTE - CAS EDP DISCH DISPOSITION ADMI
2021    TELEHEALTH EVALUATION -- Audio/Visual (During BVMER-98 public health emergency)    Due to COVID 19 outbreak, patient's office visit was converted to a virtual visit. Patient was contacted and agreed to proceed with a virtual visit via D[employer]. me  The risks and benefits of converting to a virtual visit were discussed in light of the current infectious disease epidemic. Patient also understood that insurance coverage and co-pays are up to their individual insurance plans. Lopez Brian, was evaluated through a synchronous (real-time) audio-video encounter. The patient (or guardian if applicable) is aware that this is a billable service. Verbal consent to proceed has been obtained within the past 12 months. The visit was conducted pursuant to the emergency declaration under the [address] [place] [address] authority and the [employer] General Act. Patient identification was verified, and a caregiver was present when appropriate. The patient was located in a state where the provider was credentialed to provide care. Total time spent for this encounter: Not billed by time    The patient is talking with me virtually from her home and I am located at my office in [facility]. HPI:    Lopez Brian (:  1970) has requested an audio/video evaluation for the following concern(s): Anxiety: she states that is nervous about getting the covid-19 vaccine. She got very ill from a flu vaccine in the past.   She took more klonopin when she was doing well with cutting back on smoking. She generally takes a half of klonopin in the morning to help with stress in the morning. She states that she does not have severe anxiety on a daily basis but there are some episodes of panic attacks for which she takes the Klonopin. She denies any significant down or depressed moods. No thoughts of self-harm or harm to others. Allergies: she has had bad allergy symptoms this spring. She has had post nasal drip and dry cough for several years and has had allergies since she was a child. Not had any chest congestion or sputum production. No sinus pain or pressure. No ear pain or pressure. Has been using the Flonase nasal spray. Did not want to try the Atrovent because of potential side effects that she looked into. Generally this time of year is worse for her. Current smoker: a half pack per day currently. She had been down to very few per day but then went back up. Review of Systems   This patient reports no chest pain or pressure. There is no shortness of breath or cough. The patient reports no nausea or vomiting. There is no heartburn or indigestion. There is no diarrhea or constipation. No black, bloody, mucusy or tarry stool noticed. The patient reports no bloating and no change in appetite. There is no numbness, tingling or swelling in the extremities. Prior to Visit Medications    Medication Sig Taking? Authorizing Provider   clonazePAM (KLONOPIN) 0.5 MG tablet Take 1 tablet by mouth 2 times daily as needed for Anxiety for up to 30 days. Yes OANH Song CNP   fluticasone (FLONASE) 50 MCG/ACT nasal spray 2 sprays by Nasal route daily Yes OANH Song CNP       Social History     Tobacco Use    Smoking status: Current Every Day Smoker     Packs/day: 0.50    Smokeless tobacco: Never Used   Substance Use Topics    Alcohol use:  Yes    Drug use: No          PHYSICAL EXAMINATION:  [ INSTRUCTIONS:  \"[x]\" Indicates a positive item  \"[]\" Indicates a negative item  -- DELETE ALL ITEMS NOT EXAMINED]  [x] Alert  [x] Oriented to person/place/time    [x] No apparent distress  [] Toxic appearing    [] Face flushed appearing [] Sclera clear  [] Lips are cyanotic      [x] Breathing appears normal  [] Appears tachypneic      [] Rash on visible skin    [x] Cranial Nerves II-XII grossly intact [x] Motor grossly intact in visible upper extremities    [] Motor grossly intact in visible lower extremities    [x] Normal Mood  [] Anxious appearing    [] Depressed appearing  [] Confused appearing      [] Poor short term memory  [] Poor long term memory    [] OTHER:       Due to this being a TeleHealth encounter, evaluation of the following organ systems is limited: Vitals/Constitutional/EENT/Resp/CV/GI//MS/Neuro/Skin/Heme-Lymph-Imm. ASSESSMENT/PLAN:   Diagnosis Orders   1. Anxiety  clonazePAM (KLONOPIN) 0.5 MG tablet   2. Panic attacks  clonazePAM (KLONOPIN) 0.5 MG tablet   3. Acute non-recurrent pansinusitis  fluticasone (FLONASE) 50 MCG/ACT nasal spray   4. Allergic rhinitis, unspecified seasonality, unspecified trigger     5. Post-nasal drip     6. Current smoker     7. High risk medication use  Pain Management Drug Screen         Return in about 3 months (around 8/4/2021) for anxiety- in office. 1. 2.  7.  Anxiety symptoms have been stable lately. Reassurance given regarding COVID-19 vaccination. She would like to have this done so that she can travel in the future and see her family in [facility]. Klonopin refill given and she will come to the office this morning to leave urine sample and signed medication contract. 3. 4. 5.  Flonase refill given. She denies any signs or symptoms of sinus infection. She will notify me if symptoms do not improve. 6.  She plans to try to cut back on smoking again in the near future. She will let me know if she needs any assistance with this in the future. Controlled Substance Monitoring:    Acute and Chronic Pain Monitoring:   RX Monitoring 5/4/2021   Attestation -   Periodic Controlled Substance Monitoring Possible medication side effects, risk of tolerance/dependence & alternative treatments discussed. ;No signs of potential drug abuse or diversion identified. ;Assessed functional status.    Chronic Pain > 80 MEDD -       Please note this report has been partially produced using speech recognition software and may cause contain errors related to that system including grammar, punctuation and spelling as well as words and phrases that may seem inappropriate. If there are questions or concerns please feel free to contact me to clarify. An  electronic signature was used to authenticate this note. --OANH Jaime - CNP on 5/4/2021 at 9:29 AM        Pursuant to the emergency declaration under the [address] [place] [address], [facility] waiver authority and the [employer] Act, this Virtual  Visit was conducted, with patient's consent, to reduce the patient's risk of exposure to COVID-19 and provide continuity of care for an established patient. Services were provided through a video synchronous discussion virtually to substitute for in-person clinic visit. 3 east/Telemetry

## 2023-04-20 NOTE — ED ADULT NURSE NOTE - OBJECTIVE STATEMENT
Patient presented to the ED bib daughter c/o chest pain and wheezing. Daughter reports patient has dx of congestive heart failure, on Lasix at home. Daughter reports symptoms began yesterday and have been increasingly worsening. pt also has a healing laceration to left finger that appears black.

## 2023-04-20 NOTE — ED ADULT TRIAGE NOTE - CHIEF COMPLAINT QUOTE
Patient presented to the ED bibdaughter c/o chest pain and wheezing. Daughter reports patient has dx of congestive heart failure, on Lasix at home. Daughter reports symptoms began yesterday and have been increasingly worsening. Patient does not appear to be in any distress at this time. Patient breathing even and unlabored at this time. HR: 66, O2: 98% in triage.

## 2023-04-20 NOTE — ED ADULT NURSE NOTE - CHIEF COMPLAINT QUOTE
Patient presented to the ED bib daughter c/o chest pain and wheezing. Daughter reports patient has dx of congestive heart failure, on Lasix at home. Daughter reports symptoms began yesterday and have been increasingly worsening. Patient does not appear to be in any distress at this time. Patient breathing even and unlabored at this time. HR: 66, O2: 98% in triage.

## 2023-04-20 NOTE — ED STATDOCS - ATTENDING APP SHARED VISIT CONTRIBUTION OF CARE
I, Ian Lucas MD, personally saw the patient with RENZO.  I have personally performed a face to face diagnostic evaluation on this patient.  I have reviewed the RENZO note and agree with the history, exam, and plan of care, except as noted.

## 2023-04-20 NOTE — ED STATDOCS - CLINICAL SUMMARY MEDICAL DECISION MAKING FREE TEXT BOX
patient with x2 days of exertional dyspnea now with chest pain and necrotic distal phalanx of left 3rd finger, will likely require amputation. will need infect workup to r/o secondary infection from finger wound, likely require admission.

## 2023-04-20 NOTE — ED STATDOCS - NS ED ROS FT
Constitutional: No fevers, chills, or sweats.  Cardiac: No exertional dyspnea, orthopnea +cp  Respiratory: No cough +SOB  GI: No abdominal pain, no N/V/D  Neuro: No headaches, no neck pain/stiffness, no numbness  All other systems reviewed and are negative unless otherwise stated in the HPI. Constitutional: No fevers, chills, or sweats.  Cardiac: +exertional dyspnea, chest pain   Respiratory: No cough +SOB  GI: No abdominal pain, no N/V/D  Neuro: No headaches, no neck pain/stiffness, no numbness  All other systems reviewed and are negative unless otherwise stated in the HPI.

## 2023-04-20 NOTE — ED STATDOCS - PHYSICAL EXAMINATION
General: AAOx3, NAD  HEENT: NCAT  Cardiac: Normal rate and rhythym, no murmurs, normal peripheral perfusion  Respiratory: Normal rate and effort. CTAB minimal crackle sof bases  GI: Soft, nondistended, nontender  Neuro: No focal deficits. HECK equally x4, sensation to light touch intact throughout  MSK: FROMx4, no focal bony tenderness, no peripheral edema, +L 3rd finger distal phalanx with dry gangrene mild swelling of middle phalanx, no proximally spreading erythema.  Skin: No rash

## 2023-04-21 ENCOUNTER — TRANSCRIPTION ENCOUNTER (OUTPATIENT)
Age: 88
End: 2023-04-21

## 2023-04-21 DIAGNOSIS — Z98.49 CATARACT EXTRACTION STATUS, UNSPECIFIED EYE: Chronic | ICD-10-CM

## 2023-04-21 DIAGNOSIS — E78.5 HYPERLIPIDEMIA, UNSPECIFIED: Chronic | ICD-10-CM

## 2023-04-21 PROBLEM — Z86.79 PERSONAL HISTORY OF OTHER DISEASES OF THE CIRCULATORY SYSTEM: Chronic | Status: ACTIVE | Noted: 2023-04-01

## 2023-04-21 LAB
ABO RH CONFIRMATION: SIGNIFICANT CHANGE UP
ANION GAP SERPL CALC-SCNC: 5 MMOL/L — SIGNIFICANT CHANGE UP (ref 5–17)
BUN SERPL-MCNC: 73 MG/DL — HIGH (ref 7–23)
CALCIUM SERPL-MCNC: 8.9 MG/DL — SIGNIFICANT CHANGE UP (ref 8.5–10.1)
CHLORIDE SERPL-SCNC: 101 MMOL/L — SIGNIFICANT CHANGE UP (ref 96–108)
CO2 SERPL-SCNC: 25 MMOL/L — SIGNIFICANT CHANGE UP (ref 22–31)
CREAT SERPL-MCNC: 1.64 MG/DL — HIGH (ref 0.5–1.3)
CRP SERPL-MCNC: 36 MG/L — HIGH
EGFR: 30 ML/MIN/1.73M2 — LOW
GLUCOSE SERPL-MCNC: 103 MG/DL — HIGH (ref 70–99)
HCT VFR BLD CALC: 23.3 % — LOW (ref 34.5–45)
HGB BLD-MCNC: 7.7 G/DL — LOW (ref 11.5–15.5)
MCHC RBC-ENTMCNC: 26.2 PG — LOW (ref 27–34)
MCHC RBC-ENTMCNC: 33 GM/DL — SIGNIFICANT CHANGE UP (ref 32–36)
MCV RBC AUTO: 79.3 FL — LOW (ref 80–100)
PLATELET # BLD AUTO: 132 K/UL — LOW (ref 150–400)
POTASSIUM SERPL-MCNC: 4.3 MMOL/L — SIGNIFICANT CHANGE UP (ref 3.5–5.3)
POTASSIUM SERPL-SCNC: 4.3 MMOL/L — SIGNIFICANT CHANGE UP (ref 3.5–5.3)
RBC # BLD: 2.94 M/UL — LOW (ref 3.8–5.2)
RBC # FLD: 14.7 % — HIGH (ref 10.3–14.5)
SODIUM SERPL-SCNC: 131 MMOL/L — LOW (ref 135–145)
WBC # BLD: 9.09 K/UL — SIGNIFICANT CHANGE UP (ref 3.8–10.5)
WBC # FLD AUTO: 9.09 K/UL — SIGNIFICANT CHANGE UP (ref 3.8–10.5)

## 2023-04-21 PROCEDURE — 99223 1ST HOSP IP/OBS HIGH 75: CPT

## 2023-04-21 PROCEDURE — 12345: CPT | Mod: NC

## 2023-04-21 RX ORDER — ATENOLOL 25 MG/1
1 TABLET ORAL
Refills: 0 | DISCHARGE

## 2023-04-21 RX ORDER — ATENOLOL 25 MG/1
50 TABLET ORAL DAILY
Refills: 0 | Status: DISCONTINUED | OUTPATIENT
Start: 2023-04-21 | End: 2023-04-22

## 2023-04-21 RX ORDER — FAMOTIDINE 10 MG/ML
10 INJECTION INTRAVENOUS DAILY
Refills: 0 | Status: DISCONTINUED | OUTPATIENT
Start: 2023-04-21 | End: 2023-04-22

## 2023-04-21 RX ORDER — MELOXICAM 15 MG/1
1 TABLET ORAL
Refills: 0 | DISCHARGE

## 2023-04-21 RX ORDER — AMLODIPINE BESYLATE 2.5 MG/1
10 TABLET ORAL DAILY
Refills: 0 | Status: DISCONTINUED | OUTPATIENT
Start: 2023-04-21 | End: 2023-04-22

## 2023-04-21 RX ORDER — AMLODIPINE BESYLATE 2.5 MG/1
1 TABLET ORAL
Refills: 0 | DISCHARGE

## 2023-04-21 RX ORDER — HYDRALAZINE HCL 50 MG
50 TABLET ORAL
Refills: 0 | Status: DISCONTINUED | OUTPATIENT
Start: 2023-04-21 | End: 2023-04-22

## 2023-04-21 RX ORDER — CEFEPIME 1 G/1
INJECTION, POWDER, FOR SOLUTION INTRAMUSCULAR; INTRAVENOUS
Refills: 0 | Status: DISCONTINUED | OUTPATIENT
Start: 2023-04-21 | End: 2023-04-21

## 2023-04-21 RX ORDER — FAMOTIDINE 10 MG/ML
1 INJECTION INTRAVENOUS
Refills: 0 | DISCHARGE

## 2023-04-21 RX ORDER — CEFEPIME 1 G/1
1000 INJECTION, POWDER, FOR SOLUTION INTRAMUSCULAR; INTRAVENOUS EVERY 12 HOURS
Refills: 0 | Status: DISCONTINUED | OUTPATIENT
Start: 2023-04-21 | End: 2023-04-21

## 2023-04-21 RX ORDER — SIMVASTATIN 20 MG/1
10 TABLET, FILM COATED ORAL AT BEDTIME
Refills: 0 | Status: DISCONTINUED | OUTPATIENT
Start: 2023-04-21 | End: 2023-04-22

## 2023-04-21 RX ORDER — CLOPIDOGREL BISULFATE 75 MG/1
75 TABLET, FILM COATED ORAL DAILY
Refills: 0 | Status: DISCONTINUED | OUTPATIENT
Start: 2023-04-21 | End: 2023-04-22

## 2023-04-21 RX ORDER — SIMVASTATIN 20 MG/1
1 TABLET, FILM COATED ORAL
Refills: 0 | DISCHARGE

## 2023-04-21 RX ORDER — FUROSEMIDE 40 MG
40 TABLET ORAL DAILY
Refills: 0 | Status: COMPLETED | OUTPATIENT
Start: 2023-04-21 | End: 2023-04-22

## 2023-04-21 RX ORDER — HYDRALAZINE HCL 50 MG
1 TABLET ORAL
Refills: 0 | DISCHARGE

## 2023-04-21 RX ORDER — VANCOMYCIN HCL 1 G
500 VIAL (EA) INTRAVENOUS EVERY 24 HOURS
Refills: 0 | Status: DISCONTINUED | OUTPATIENT
Start: 2023-04-21 | End: 2023-04-21

## 2023-04-21 RX ORDER — CLOPIDOGREL BISULFATE 75 MG/1
1 TABLET, FILM COATED ORAL
Refills: 0 | DISCHARGE

## 2023-04-21 RX ORDER — CEFEPIME 1 G/1
1000 INJECTION, POWDER, FOR SOLUTION INTRAMUSCULAR; INTRAVENOUS ONCE
Refills: 0 | Status: COMPLETED | OUTPATIENT
Start: 2023-04-21 | End: 2023-04-21

## 2023-04-21 RX ADMIN — AMLODIPINE BESYLATE 10 MILLIGRAM(S): 2.5 TABLET ORAL at 10:47

## 2023-04-21 RX ADMIN — FAMOTIDINE 10 MILLIGRAM(S): 10 INJECTION INTRAVENOUS at 15:55

## 2023-04-21 RX ADMIN — SIMVASTATIN 10 MILLIGRAM(S): 20 TABLET, FILM COATED ORAL at 21:07

## 2023-04-21 RX ADMIN — CLOPIDOGREL BISULFATE 75 MILLIGRAM(S): 75 TABLET, FILM COATED ORAL at 10:47

## 2023-04-21 RX ADMIN — Medication 40 MILLIGRAM(S): at 10:48

## 2023-04-21 RX ADMIN — Medication 100 MILLIGRAM(S): at 03:32

## 2023-04-21 RX ADMIN — Medication 50 MILLIGRAM(S): at 10:50

## 2023-04-21 RX ADMIN — ATENOLOL 50 MILLIGRAM(S): 25 TABLET ORAL at 10:47

## 2023-04-21 RX ADMIN — Medication 50 MILLIGRAM(S): at 21:07

## 2023-04-21 RX ADMIN — CEFEPIME 1000 MILLIGRAM(S): 1 INJECTION, POWDER, FOR SOLUTION INTRAMUSCULAR; INTRAVENOUS at 03:32

## 2023-04-21 NOTE — DISCHARGE NOTE NURSING/CASE MANAGEMENT/SOCIAL WORK - PATIENT PORTAL LINK FT
You can access the FollowMyHealth Patient Portal offered by [facility] by registering at the following website: [email or web address]. By joining [employer]’s FollowMyHealth portal, you will also be able to view your health information using other applications (apps) compatible with our system.

## 2023-04-21 NOTE — PROGRESS NOTE ADULT - SUBJECTIVE AND OBJECTIVE BOX
Patient seen and examined at bedside. Poor historian given history of dementia. Patient denies any numbness, tingling, weakness, or any other orthopaedic complaint. S/p lasix for CHF.     Exam:   T(C): 36.3 (23 @ 00:56), Max: 36.8 (23 @ 19:56)  T(F): 97.3 (23 @ 00:56), Max: 98.2 (23 @ 19:56)  HR: 68 (23 @ 00:56) (60 - 68)  BP: 147/49 (23 @ 00:56) (135/55 - 147/49)  RR: 18 (23 @ 00:56) (16 - 18)  SpO2: 100% (23 @ 00:56) (97% - 100%)    Gen: resting in bed, NAD  General: NAD, Awake and Alert  L hand  necrotic eschar over left middle finger distal phalanx  no sensation at fingertip over eschar  SILT proximal to necrotic site  Able to make full fist and extend fingers  AIN/PIN/U intact  cap refill <2 sec proximal to necrotic site    Laboratory Results:   CBC, 23 @ 21:00    WBC: 10.42  Hgb: 8.2  Hct: 24.8  Plt: 233      Chemistry, 23 @ 21:00    Na: 127     AST: 39  K: 5.1       ALT: 32  Cl: 99      TProt: 8.3  CO2: 22     Alb: 3.5  BUN: 81     TBili: 0.4  Cr: 1.95      AP: 76  Glu: 115       M.2  P: --    eGFR: --  eGFR, AA: --    Assessment/Plan:  88y Female with necrosis of LMF distal phalanx    -local wound care to be performed by ortho team  -will require surgical amputation which is nonurgent at this time  -FU medical optimization, please document  -dressed with non-circumferential xeroform, gauze, ava  -FU BCx, ESR/CRP  -Pain control as needed  -DVT ppx per primary team  -WBAT L hand  -discussed with Dr. Espinoza who agrees with plan Patient seen and examined at bedside. Poor historian given history of dementia. Patient denies any numbness, tingling, weakness, or any other orthopaedic complaint. S/p lasix for CHF.     Exam:   T(C): 36.3 (23 @ 00:56), Max: 36.8 (23 @ 19:56)  T(F): 97.3 (23 @ 00:56), Max: 98.2 (23 @ 19:56)  HR: 68 (23 @ 00:56) (60 - 68)  BP: 147/49 (23 @ 00:56) (135/55 - 147/49)  RR: 18 (23 @ 00:56) (16 - 18)  SpO2: 100% (23 @ 00:56) (97% - 100%)    Gen: resting in bed, NAD  General: NAD, Awake and Alert  L hand  necrotic eschar over left middle finger distal phalanx  no sensation at fingertip over eschar  SILT proximal to necrotic site  Able to make full fist and extend fingers  AIN/PIN/U intact  cap refill <2 sec proximal to necrotic site    Laboratory Results:   CBC, 23 @ 21:00    WBC: 10.42  Hgb: 8.2  Hct: 24.8  Plt: 233      Chemistry, 23 @ 21:00    Na: 127     AST: 39  K: 5.1       ALT: 32  Cl: 99      TProt: 8.3  CO2: 22     Alb: 3.5  BUN: 81     TBili: 0.4  Cr: 1.95      AP: 76  Glu: 115       M.2  P: --    eGFR: --  eGFR, AA: --    Assessment/Plan:  88y Female with necrosis of LMF distal phalanx    -local wound care to be performed by ortho team  -will require surgical amputation which is nonurgent at this time, if patient is still in the hospital next week will re-evaluate for surgery  -No acute orthopaedic surgical intervention indicated at this time. This patient is orthopaedically stable for discharge planning.   -If discharged before next week, patient to follow up with Dr. Espinoza as an outpatient for further evaluation and management.   -FU medical/cardio optimization, please document  -dressed with non-circumferential xeroform, gauze, ava  -FU BCx, ESR/CRP  -Pain control as needed  -DVT ppx per primary team  -WBAT L hand  -discussed with Dr. Espinoza who agrees with plan

## 2023-04-21 NOTE — H&P ADULT - NSICDXFAMILYHX_GEN_ALL_CORE_FT
FAMILY HISTORY:  No pertinent family history in first degree relatives     FAMILY HISTORY:  Father  Still living? Unknown  FH: HTN (hypertension), Age at diagnosis: Age Unknown    Mother  Still living? Unknown  FH: HTN (hypertension), Age at diagnosis: Age Unknown    Sibling  Still living? Unknown  FH: HTN (hypertension), Age at diagnosis: Age Unknown

## 2023-04-21 NOTE — H&P ADULT - HISTORY OF PRESENT ILLNESS
88 year old female PMHx significant for diastolic CHF (HFpEF) (2DECHO from 1/2023 revealed a LVEF 66%, moderate LVH, moderate left atrial dilatation, normal wall motion. Mild aortic stenosis, moderate to severe mitral regurgitation. Mild pulmonary hypertension), severe left ICA stenosis (decision was made to continue medical management given her age and risk factors for surgical intervention), Hypertension, and Hyperlipidemia who presents to the [facility] for further evaluation and management of c/o intermittent chest pain, shortness of breath, anf dyspnea on exertion which began yesterday. Additionally per the patient's daughter at the bedside the patient was evaluated on 4/1/2023 in the [facility] for management of a cut to her the LMF distal phalanx while chopping onions. The patient had her finger reportedly dressed tightly to stop the bleeding, however, the following day she noticed it was "purple" so she went back to on 4/7 where they undressed the finger and wrapped it more loosely. The patient reportedly returned on 4/8 to the ED requiring cauterization of the finger with silver nitrate. Several days ago the daughter noticed the finger was turning black so she came back to ED for further evaluation.  88 year old female PMHx significant for diastolic CHF (HFpEF) (2DECHO from 1/2023 revealed a LVEF 66%, moderate LVH, moderate left atrial dilatation, normal wall motion. Mild aortic stenosis, moderate to severe mitral regurgitation. Mild pulmonary hypertension), severe left ICA stenosis (decision was made to continue medical management given her age and risk factors for surgical intervention), Hypertension, and Hyperlipidemia who presents to the [facility] for further evaluation and management of c/o intermittent chest pain, shortness of breath, anf dyspnea on exertion which began yesterday. Additionally per the patient's daughter at the bedside the patient was evaluated on 4/1/2023 in the [facility] for management of a cut to her the LMF distal phalanx while chopping onions. The patient had her finger reportedly dressed tightly to stop the bleeding, however, the following day she noticed it was "purple" so she went back to on 4/7 where they undressed the finger and wrapped it more loosely. The patient reportedly returned on 4/8 to the ED requiring cauterization of the finger with silver nitrate. Several days ago the daughter noticed the finger was turning black so she came back to ED for further evaluation.   Vital signs in triage => /51, HR 64/min, RR 17/min, TMax 98.2'F, SpO2 97% on room air. Labs => ESR 72, Hgb/Hct 8.2/24.8, Na 127, BUN/Cr 81/1.95, TnI (-) x 1, proBNP 65355, UA (+). In the ED the patient was given Furosemide 20mg IVP x 1.

## 2023-04-21 NOTE — H&P ADULT - NSHPPHYSICALEXAM_GEN_ALL_CORE
Vital Signs Last 24 Hrs  T(C): 36.3 (21 Apr 2023 00:56), Max: 36.8 (20 Apr 2023 19:56)  T(F): 97.3 (21 Apr 2023 00:56), Max: 98.2 (20 Apr 2023 19:56)  HR: 68 (21 Apr 2023 00:56) (60 - 68)  BP: 147/49 (21 Apr 2023 00:56) (135/55 - 147/49)  BP(mean): 103 (20 Apr 2023 19:56) (103 - 103)  RR: 18 (21 Apr 2023 00:56) (16 - 18)  SpO2: 100% (21 Apr 2023 00:56) (97% - 100%)    Parameters below as of 21 Apr 2023 00:56  Patient On (Oxygen Delivery Method): room air

## 2023-04-21 NOTE — PATIENT PROFILE ADULT - FALL HARM RISK - HARM RISK INTERVENTIONS

## 2023-04-21 NOTE — DISCHARGE NOTE NURSING/CASE MANAGEMENT/SOCIAL WORK - NSDCFUADDAPPT_GEN_ALL_CORE_FT
Faxed discharge papers to Karel Padilla MD at  [phone] (Dara) Faxed discharge papers to Karel Pina MD at  [phone] (Dara)  follow up made with doctor pina 4/27/23 at 12:10

## 2023-04-21 NOTE — DISCHARGE NOTE NURSING/CASE MANAGEMENT/SOCIAL WORK - NSDCVIVACCINE_GEN_ALL_CORE_FT
Tdap; 18-Nov-2019 11:03; Jovanna Jaimes (CARLOS); [employer]; [id number] (Exp. Date: 22-Oct-2021); IntraMuscular; Deltoid Left.; 0.5 milliLiter(s); VIS (VIS Published: 09-May-2013, VIS Presented: 18-Nov-2019);

## 2023-04-21 NOTE — PROGRESS NOTE ADULT - ASSESSMENT
88 year old female PMHx significant for diastolic CHF (HFpEF) (2DECHO from 1/2023 revealed a LVEF 66%, moderate LVH, moderate left atrial dilatation, normal wall motion. Mild aortic stenosis, moderate to severe mitral regurgitation. Mild pulmonary hypertension), severe left ICA stenosis (decision was made to continue medical management given her age and risk factors for surgical intervention), Hypertension, and Hyperlipidemia who presents to the [facility] for further evaluation and management of c/o intermittent chest pain, shortness of breath, anf dyspnea on exertion which began yesterday. Additionally per the patient's daughter at the bedside the patient was evaluated on 4/1/2023 in the [facility] for management of a cut to her the LMF distal phalanx while chopping onions. The patient had her finger reportedly dressed tightly to stop the bleeding, however, the following day she noticed it was "purple" so she went back to on 4/7 where they undressed the finger and wrapped it more loosely. The patient reportedly returned on 4/8 to the ED requiring cauterization of the finger with silver nitrate. Several days ago the daughter noticed the finger was turning black so she came back to ED for further evaluation.     #Acute on Chronic CHF (HFpEF)  -on RA  -tele  -trop neg  -BNP 11K  -had echo 1/23  -lasix 40iv qd for now  -f/u cards recs (Moustapha)    #Necrosis of LMF distal phalanx  -ESR elevated, will repeat  -BCx pending  -appreciate ortho input, for nonemergent amputation, Dr. Espinoza  -appreciate ID input, stop abx, no evidence of superimposed infection, will monitor off abx    #HTN  -norvasc, atenlolol, hydral    #Severe left ICA stenosis  -plavix    #?CKD  -trend    #hypoNa  -improving, trend    #DVT ppx- SCDs    Updated daughter at bedside    Dispo pending adequate diuresis, possible weekend.

## 2023-04-21 NOTE — PHARMACOTHERAPY INTERVENTION NOTE - COMMENTS
Medication history complete, reviewed medications with patient daughter and confirmed with doctor first med hx.

## 2023-04-21 NOTE — H&P ADULT - NSICDXPASTMEDICALHX_GEN_ALL_CORE_FT
PAST MEDICAL HISTORY:  History of carotid stenosis     HTN (hypertension)      PAST MEDICAL HISTORY:  Chronic diastolic congestive heart failure     History of carotid stenosis     HTN (hypertension)

## 2023-04-21 NOTE — PROGRESS NOTE ADULT - SUBJECTIVE AND OBJECTIVE BOX
HOSPITALIST ATTENDING PROGRESS NOTE    Chart and meds reviewed.  Patient seen and examined.    CC: CP    Subjective: Denies CP, some SOB. No finger pain.     All other systems reviewed and found to be negative with the exception of what has been described above.    MEDICATIONS  (STANDING):  amLODIPine   Tablet 10 milliGRAM(s) Oral daily  atenolol  Tablet 50 milliGRAM(s) Oral daily  clopidogrel Tablet 75 milliGRAM(s) Oral daily  famotidine    Tablet 10 milliGRAM(s) Oral daily  furosemide   Injectable 40 milliGRAM(s) IV Push daily  hydrALAZINE 50 milliGRAM(s) Oral two times a day  simvastatin 10 milliGRAM(s) Oral at bedtime    MEDICATIONS  (PRN):      VITALS:  T(F): 99.1 (23 @ 07:53), Max: 99.1 (23 @ 07:53)  HR: 71 (23 @ 07:53) (60 - 71)  BP: 154/47 (23 @ 07:53) (135/55 - 154/47)  RR: 19 (23 @ 07:53) (16 - 19)  SpO2: 100% (23 @ 07:53) (97% - 100%)  Wt(kg): --    I&O's Summary      CAPILLARY BLOOD GLUCOSE          PHYSICAL EXAM:  Gen: NAD  HEENT:  pupils equal and reactive, EOMI, no oropharyngeal lesions, erythema, exudates, oral thrush  NECK:   supple, no carotid bruits, no palpable lymph nodes, no thyromegaly  CV:  +S1, +S2, regular, no murmurs or rubs  RESP:   lungs clear to auscultation bilaterally, no wheezing, rales, rhonchi, good air entry bilaterally  BREAST:  not examined  GI:  abdomen soft, non-tender, non-distended, normal BS, no bruits, no abdominal masses, no palpable masses  RECTAL:  not examined  :  not examined  MSK:   normal muscle tone, no atrophy, no rigidity, no contractions  EXT:  + L middle finger distal phalanx necrosis  VASCULAR:  pulses equal and symmetric in the upper and lower extremities  NEURO:  AAOX3, no focal neurological deficits, follows all commands, able to move extremities spontaneously  SKIN:  no ulcers, lesions or rashes    LABS:                            7.7    9.09  )-----------( 132      ( 2023 10:46 )             23.3         131<L>  |  101  |  73<H>  ----------------------------<  103<H>  4.3   |  25  |  1.64<H>    Ca    8.9      2023 10:46  Mg     2.2         TPro  8.3  /  Alb  3.5  /  TBili  0.4  /  DBili  x   /  AST  39<H>  /  ALT  32  /  AlkPhos  76          LIVER FUNCTIONS - ( 2023 21:00 )  Alb: 3.5 g/dL / Pro: 8.3 gm/dL / ALK PHOS: 76 U/L / ALT: 32 U/L / AST: 39 U/L / GGT: x           PT/INR - ( 2023 21:00 )   PT: 11.9 sec;   INR: 1.03 ratio         PTT - ( 2023 21:00 )  PTT:24.6 sec  Urinalysis Basic - ( 2023 21:55 )    Color: Yellow / Appearance: Clear / S.010 / pH: x  Gluc: x / Ketone: Negative  / Bili: Negative / Urobili: Negative   Blood: x / Protein: 15 / Nitrite: Negative   Leuk Esterase: Small / RBC: 3-5 /HPF / WBC 26-50 /HPF   Sq Epi: x / Non Sq Epi: x / Bacteria: Occasional    Lactate, Blood: 1.0 mmol/L ( @ 21:01)    CULTURES:  Blood, Urine: Trace ( @ 21:55)  BCx pending    Additional results/Imaging, I have personally reviewed:  CXR 23: CHF    L hand xray 23: No gross cortical destruction or soft tissue emphysema    Telemetry, personally reviewed:  23: sinus 70-80

## 2023-04-21 NOTE — H&P ADULT - NSHPROSALLOTHERNEGRD_GEN_ALL_CORE
Comes in with generalized anasarca, extremities , abdomen with mild sob , otherwise comfortable appearing on exam   Likely from hypoalbuminemia  Could be from chronic state of immobilization, poor po intake in past due to sbo  No known heart failure, no JVD on exam , pro-bnp not so high (164) ; pending ECHO :normal   No known liver disease, lfts WNL   No known malignancy or risk factors , outpatient screening negative for cancer ; however CEA elevated ; CT abdomen shows no mass   No exposure to TB/ no night sweats / weight loss   On lasix 40 bid at home   c/w lasix 40 iv bid   Cain catheter to monitor uop   I&Os strict    f/u TSH wnl  UA negative for proteinuria    s/p paracentesis All other review of systems negative, except as noted in HPI Comes in with generalized anasarca, extremities , abdomen with mild sob , otherwise comfortable appearing on exam   Likely from hypoalbuminemia  Could be from chronic state of immobilization, poor po intake in past due to sbo  No known heart failure, no JVD on exam , pro-bnp not so high (164) ; pending ECHO :normal   No known liver disease, lfts WNL   No known malignancy or risk factors , outpatient screening negative for cancer ; however CEA elevated ; CT abdomen shows no mass   No exposure to TB/ no night sweats / weight loss   On lasix 40 bid at home   lasix tapered to 20 iv bid  Cain catheter to monitor uop   I&Os strict    f/u TSH wnl  UA negative for proteinuria    s/p paracentesis

## 2023-04-21 NOTE — H&P ADULT - ASSESSMENT
88 year old female PMHx significant for diastolic CHF (HFpEF) (2DECHO from 1/2023 revealed a LVEF 66%, moderate LVH, moderate left atrial dilatation, normal wall motion. Mild aortic stenosis, moderate to severe mitral regurgitation. Mild pulmonary hypertension), severe left ICA stenosis (decision was made to continue medical management given her age and risk factors for surgical intervention), Hypertension, and Hyperlipidemia who presents to the [facility] for further evaluation and management of c/o intermittent chest pain, shortness of breath, anf dyspnea on exertion which began yesterday. Additionally per the patient's daughter at the bedside the patient was evaluated on 4/1/2023 in the [facility] for management of a cut to her the LMF distal phalanx while chopping onions. The patient had her finger reportedly dressed tightly to stop the bleeding, however, the following day she noticed it was "purple" so she went back to on 4/7 where they undressed the finger and wrapped it more loosely. The patient reportedly returned on 4/8 to the ED requiring cauterization of the finger with silver nitrate. Several days ago the daughter noticed the finger was turning black so she came back to ED for further evaluation.      88 year old female PMHx significant for diastolic CHF (HFpEF) (2DECHO from 1/2023 revealed a LVEF 66%, moderate LVH, moderate left atrial dilatation, normal wall motion. Mild aortic stenosis, moderate to severe mitral regurgitation. Mild pulmonary hypertension), severe left ICA stenosis (decision was made to continue medical management given her age and risk factors for surgical intervention), Hypertension, and Hyperlipidemia who presents to the [facility] for further evaluation and management of c/o intermittent chest pain, shortness of breath, anf dyspnea on exertion which began yesterday. Additionally per the patient's daughter at the bedside the patient was evaluated on 4/1/2023 in the [facility] for management of a cut to her the LMF distal phalanx while chopping onions. The patient had her finger reportedly dressed tightly to stop the bleeding, however, the following day she noticed it was "purple" so she went back to on 4/7 where they undressed the finger and wrapped it more loosely. The patient reportedly returned on 4/8 to the ED requiring cauterization of the finger with silver nitrate. Several days ago the daughter noticed the finger was turning black so she came back to ED for further evaluation.     #Acute on Chronic CHF (HFpEF)  ~admit to Telemetry  ~serial Mary/EKGs  ~f/u w/ Cardiology in the am  ~strict I/Os  ~daily weights  ~cont. IV diuresis    #Necrosis of LMF distal phalanx  ~Orthopedics consultation greatly appreciated  ~local wound care to be performed by Orthopedics  ~as per Orthopedics the patient will require surgical amputation which is nonurgent at this time  ~f/u PAN C+S  ~f/u am labs including ESR/CRP  ~cont. pain management  ~cont. IV abx   ~cont. pain management prn   ~WBAT Left hand    #Hypertension  ~cont. Amlodipine 10mg po daily  ~cont. Atenolol 50mg po daily  ~cont. Hydralazine 50mg po bid     #Hyperlipidemia  ~cont. statin therapy with Simvastatin 10mg po qhs     #Severe left ICA stenosis   ~cont. medical management with Clopidogrel 75mg po daily    #Vte ppx  ~cont. SCDs for now      88 year old female PMHx significant for diastolic CHF (HFpEF) (2DECHO from 1/2023 revealed a LVEF 66%, moderate LVH, moderate left atrial dilatation, normal wall motion. Mild aortic stenosis, moderate to severe mitral regurgitation. Mild pulmonary hypertension), severe left ICA stenosis (decision was made to continue medical management given her age and risk factors for surgical intervention), Hypertension, and Hyperlipidemia who presents to the [facility] for further evaluation and management of c/o intermittent chest pain, shortness of breath, anf dyspnea on exertion which began yesterday. Additionally per the patient's daughter at the bedside the patient was evaluated on 4/1/2023 in the [facility] for management of a cut to her the LMF distal phalanx while chopping onions. The patient had her finger reportedly dressed tightly to stop the bleeding, however, the following day she noticed it was "purple" so she went back to on 4/7 where they undressed the finger and wrapped it more loosely. The patient reportedly returned on 4/8 to the ED requiring cauterization of the finger with silver nitrate. Several days ago the daughter noticed the finger was turning black so she came back to ED for further evaluation.     #Acute on Chronic CHF (HFpEF)  ~admit to Telemetry  ~note; patient with notable history of nonrheumatic mitral valve regurgitation (moderate to severe). Per chart review both patient and daughter wish for conservative management given patient's age.  ~serial Mary/EKGs  ~f/u w/ Cardiology in the am  ~strict I/Os  ~daily weights  ~cont. IV diuresis    #Necrosis of LMF distal phalanx  ~Orthopedics consultation greatly appreciated  ~local wound care to be performed by Orthopedics  ~as per Orthopedics the patient will require surgical amputation which is nonurgent at this time  ~f/u PAN C+S  ~f/u am labs including ESR/CRP  ~cont. pain management  ~cont. IV abx   ~cont. pain management prn   ~WBAT Left hand    #Hypertension  ~cont. Amlodipine 10mg po daily  ~cont. Atenolol 50mg po daily  ~cont. Hydralazine 50mg po bid     #Hyperlipidemia  ~cont. statin therapy with Simvastatin 10mg po qhs     #Severe left ICA stenosis   ~cont. medical management with Clopidogrel 75mg po daily    #Vte ppx  ~cont. SCDs for now      88 year old female PMHx significant for diastolic CHF (HFpEF) (2DECHO from 1/2023 revealed a LVEF 66%, moderate LVH, moderate left atrial dilatation, normal wall motion. Mild aortic stenosis, moderate to severe mitral regurgitation. Mild pulmonary hypertension), severe left ICA stenosis (decision was made to continue medical management given her age and risk factors for surgical intervention), Hypertension, and Hyperlipidemia who presents to the [facility] for further evaluation and management of c/o intermittent chest pain, shortness of breath, anf dyspnea on exertion which began yesterday. Additionally per the patient's daughter at the bedside the patient was evaluated on 4/1/2023 in the [facility] for management of a cut to her the LMF distal phalanx while chopping onions. The patient had her finger reportedly dressed tightly to stop the bleeding, however, the following day she noticed it was "purple" so she went back to on 4/7 where they undressed the finger and wrapped it more loosely. The patient reportedly returned on 4/8 to the ED requiring cauterization of the finger with silver nitrate. Several days ago the daughter noticed the finger was turning black so she came back to ED for further evaluation.     #Acute on Chronic CHF (HFpEF)  ~admit to Telemetry  ~note; patient with notable history of nonrheumatic mitral valve regurgitation (moderate to severe). Per chart review both patient and daughter wish for conservative management given patient's age.  ~serial Mary/EKGs  ~f/u w/ Cardiology in the am  ~strict I/Os  ~daily weights  ~cont. IV diuresis    #Necrosis of LMF distal phalanx  ~Orthopedics consultation greatly appreciated  ~local wound care to be performed by Orthopedics  ~as per Orthopedics the patient will require surgical amputation which is nonurgent at this time  ~f/u PAN C+S  ~f/u am labs including ESR/CRP  ~cont. pain management  ~cont. IV abx   ~cont. pain management prn   ~WBAT Left hand  ~f/u w/ ID consultation    #Hypertension  ~cont. Amlodipine 10mg po daily  ~cont. Atenolol 50mg po daily  ~cont. Hydralazine 50mg po bid     #Hyperlipidemia  ~cont. statin therapy with Simvastatin 10mg po qhs     #Severe left ICA stenosis   ~cont. medical management with Clopidogrel 75mg po daily    #Vte ppx  ~cont. SCDs for now

## 2023-04-21 NOTE — CONSULT NOTE ADULT - SUBJECTIVE AND OBJECTIVE BOX
Patient is a 88y old  Female who presents with a chief complaint of Chest, pain, shortness of breath, left middle finger distal phalanx necrosis (2023 06:09)      HPI:  88 year old female PMHx significant for diastolic CHF (HFpEF) (2DECHO from 2023 revealed a LVEF 66%, moderate LVH, moderate left atrial dilatation, normal wall motion. Mild aortic stenosis, moderate to severe mitral regurgitation. Mild pulmonary hypertension), severe left ICA stenosis (decision was made to continue medical management given her age and risk factors for surgical intervention), Hypertension, and Hyperlipidemia who presents to the [facility] for further evaluation and management of c/o intermittent chest pain, shortness of breath, anf dyspnea on exertion which began yesterday. Additionally per the patient's daughter at the bedside the patient was evaluated on 2023 in the [facility] for management of a cut to her the LMF distal phalanx while chopping onions. The patient had her finger reportedly dressed tightly to stop the bleeding, however, the following day she noticed it was "purple" so she went back to on  where they undressed the finger and wrapped it more loosely. The patient reportedly returned on  to the ED requiring cauterization of the finger with silver nitrate. Several days ago the daughter noticed the finger was turning black so she came back to ED for further evaluation.   Vital signs in triage => /51, HR 64/min, RR 17/min, TMax 98.2'F, SpO2 97% on room air. Labs => ESR 72, Hgb/Hct 8.2/24.8, Na 127, BUN/Cr 81/1.95, TnI (-) x 1, proBNP 77444, UA (+). In the ED the patient was given Furosemide 20mg IVP x 1. (2023 01:23)      PAST MEDICAL & SURGICAL HISTORY:  HTN (hypertension)      History of carotid stenosis      Chronic diastolic congestive heart failure      History of cataract surgery      HLD (hyperlipidemia)          PREVIOUS CARDIAC WORKUP:      Echo:  Stress Test:  Cardiac Cath:    ALLERGIES:    No Known Allergies       MEDICATIONS  (STANDING):  amLODIPine   Tablet 10 milliGRAM(s) Oral daily  atenolol  Tablet 50 milliGRAM(s) Oral daily  cefepime  Injectable.      cefepime  Injectable. 1000 milliGRAM(s) IV Push every 12 hours  clopidogrel Tablet 75 milliGRAM(s) Oral daily  famotidine    Tablet 10 milliGRAM(s) Oral daily  furosemide   Injectable 40 milliGRAM(s) IV Push daily  hydrALAZINE 50 milliGRAM(s) Oral two times a day  simvastatin 10 milliGRAM(s) Oral at bedtime  vancomycin  IVPB 500 milliGRAM(s) IV Intermittent every 24 hours    MEDICATIONS  (PRN):      FAMILY HISTORY:  FH: HTN (hypertension) (Father, Mother, Sibling)          SOCIAL HISTORY:  .scl     ROS:     .ros    Vital Signs Last 24 Hrs  T(C): 36.3 (2023 00:56), Max: 36.8 (2023 19:56)  T(F): 97.3 (2023 00:56), Max: 98.2 (2023 19:56)  HR: 68 (2023 00:56) (60 - 68)  BP: 147/49 (2023 00:56) (135/55 - 147/49)  BP(mean): 103 (2023 19:56) (103 - 103)  RR: 18 (2023 00:56) (16 - 18)  SpO2: 100% (2023 00:56) (97% - 100%)    Parameters below as of 2023 00:56  Patient On (Oxygen Delivery Method): room air        I&O's Summary      PHYSICAL EXAM:    .phy      TELEMETRY:    ECG:    LABS:                          8.2    10.42 )-----------( 233      ( 2023 21:00 )             24.8         127<L>  |  99  |  81<H>  ----------------------------<  115<H>  5.1   |  22  |  1.95<H>    Ca    8.7      2023 21:00  Mg     2.2         TPro  8.3  /  Alb  3.5  /  TBili  0.4  /  DBili  x   /  AST  39<H>  /  ALT  32  /  AlkPhos  76  -      04-20 @ 21:00  Trop-I  35.84  CK      --  CK-MB   --          PT/INR - ( 2023 21:00 )   PT: 11.9 sec;   INR: 1.03 ratio         PTT - ( 2023 21:00 )  PTT:24.6 sec  Urinalysis Basic - ( 2023 21:55 )    Color: Yellow / Appearance: Clear / S.010 / pH: x  Gluc: x / Ketone: Negative  / Bili: Negative / Urobili: Negative   Blood: x / Protein: 15 / Nitrite: Negative   Leuk Esterase: Small / RBC: 3-5 /HPF / WBC 26-50 /HPF   Sq Epi: x / Non Sq Epi: x / Bacteria: Occasional                  RADIOLOGY & ADDITIONAL STUDIES:

## 2023-04-21 NOTE — CONSULT NOTE ADULT - SUBJECTIVE AND OBJECTIVE BOX
Patient is a 88y old  Female who presents with a chief complaint of Chest, pain, shortness of breath, left middle finger distal phalanx necrosis (2023 08:10)    HPI:  88 year old female PMHx significant for diastolic CHF (HFpEF) (2DECHO from 2023 revealed a LVEF 66%, moderate LVH, moderate left atrial dilatation, normal wall motion. Mild aortic stenosis, moderate to severe mitral regurgitation. Mild pulmonary hypertension), severe left ICA stenosis Hypertension, and Hyperlipidemia who presents to the [facility] for further evaluation and management of c/o intermittent chest pain, shortness of breath, anf dyspnea on exertion. Additionally per the patient's daughter at the bedside the patient was evaluated on 2023 in the [facility] for management of a cut to her the LMF distal phalanx while chopping onions. The patient had her finger reportedly dressed tightly to stop the bleeding, however, the following day she noticed it was "purple" so she went back to on  where they undressed the finger and wrapped it more loosely. The patient reportedly returned on  to the ED requiring cauterization of the finger with silver nitrate. Several days ago the daughter noticed the finger was turning black so she came back to ED for further evaluation.  Vital signs in triage => /51, HR 64/min, RR 17/min, TMax 98.2'F, SpO2 97% on room air. Labs => ESR 72, Hgb/Hct 8.2/24.8, Na 127, BUN/Cr 81/1.95, TnI (-) x 1, proBNP 60024, UA (+). In the ED the patient was given Furosemide 20mg IVP x 1.       PMH: as above  PSH: as above  Meds: per reconciliation sheet, noted below  MEDICATIONS  (STANDING):  amLODIPine   Tablet 10 milliGRAM(s) Oral daily  atenolol  Tablet 50 milliGRAM(s) Oral daily  clopidogrel Tablet 75 milliGRAM(s) Oral daily  famotidine    Tablet 10 milliGRAM(s) Oral daily  furosemide   Injectable 40 milliGRAM(s) IV Push daily  hydrALAZINE 50 milliGRAM(s) Oral two times a day  simvastatin 10 milliGRAM(s) Oral at bedtime      Allergies    No Known Allergies    Intolerances      Social: no smoking, no alcohol, no illegal drugs; no recent travel, no exposure to TB  FAMILY HISTORY:  FH: HTN (hypertension) (Father, Mother, Sibling)       no history of premature cardiovascular disease in first degree relatives    ROS: the patient denies fever, no chills, no HA, no dizziness, no sore throat, no blurry vision, no CP, no palpitations, no SOB, no cough, no abdominal pain, no diarrhea, no N/V, no dysuria, no leg pain, no claudication, no rash, no joint aches, no rectal pain or bleeding, no night sweats    All other systems reviewed and are negative    Vital Signs Last 24 Hrs  T(C): 37.3 (2023 07:53), Max: 37.3 (2023 07:53)  T(F): 99.1 (2023 07:53), Max: 99.1 (2023 07:53)  HR: 71 (2023 07:53) (60 - 71)  BP: 154/47 (2023 07:53) (135/55 - 154/47)  BP(mean): 103 (2023 19:56) (103 - 103)  RR: 19 (2023 07:53) (16 - 19)  SpO2: 100% (2023 07:53) (97% - 100%)    Parameters below as of 2023 07:53  Patient On (Oxygen Delivery Method): room air      Daily Height in cm: 132.08 (2023 19:56)    Daily Weight in k.5 (2023 00:56)    PE:  Constitutional: frail looking  HEENT: NC/AT, EOMI, PERRLA, conjunctivae clear; ears and nose atraumatic; pharynx benign  Neck: supple; thyroid not palpable  Back: no tenderness  Respiratory: respiratory effort normal; clear to auscultation  Cardiovascular: S1S2 regular, no murmurs  Abdomen: soft, not tender, not distended, positive BS; liver and spleen WNL  Genitourinary: no suprapubic tenderness  Lymphatic: no LN palpable  Musculoskeletal: no muscle tenderness, no joint swelling or tenderness  Extremities: no pedal edema  Neurological/ Psychiatric: AxOx3, Judgement and insight normal;  moving all extremities  Skin: no rashes; no palpable lesions L distal 1st finger tip necrosis    Labs: all available labs reviewed                        7.7    9.09  )-----------( 132      ( 2023 10:46 )             23.3         131<L>  |  101  |  73<H>  ----------------------------<  103<H>  4.3   |  25  |  1.64<H>    Ca    8.9      2023 10:46  Mg     2.2         TPro  8.3  /  Alb  3.5  /  TBili  0.4  /  DBili  x   /  AST  39<H>  /  ALT  32  /  AlkPhos  76       LIVER FUNCTIONS - ( 2023 21:00 )  Alb: 3.5 g/dL / Pro: 8.3 gm/dL / ALK PHOS: 76 U/L / ALT: 32 U/L / AST: 39 U/L / GGT: x           Urinalysis Basic - ( 2023 21:55 )    Color: Yellow / Appearance: Clear / S.010 / pH: x  Gluc: x / Ketone: Negative  / Bili: Negative / Urobili: Negative   Blood: x / Protein: 15 / Nitrite: Negative   Leuk Esterase: Small / RBC: 3-5 /HPF / WBC 26-50 /HPF   Sq Epi: x / Non Sq Epi: x / Bacteria: Occasional          Radiology: all available radiological tests reviewed  < from: Xray Hand 3 Views, Left (23 @ 21:28) >    ACC: [id number] EXAM:  XR HAND MIN 3 VIEWS LT   ORDERED BY: NISREEN BRUNSON     PROCEDURE DATE:  2023          INTERPRETATION:  Clinical history: 88-year-old female, necrosis left   middle digit distal phalanx.    Three views of the left hand were obtained without comparison.    FINDINGS: Mild degenerative change with no gross cortical destruction or   soft tissue emphysema. Possible punctate foreign body at the tip of the   third digit versus tiny calcification.    IMPRESSION:  No gross cortical destruction or soft tissue emphysema    --- End of Report ---      < end of copied text >  < from: Xray Chest 1 View- PORTABLE-Urgent (23 @ 21:27) >  ACC: [id number] EXAM:  XR CHEST PORTABLE URGENT 1V   ORDERED BY:   NISREEN BRUNSON     PROCEDURE DATE:  2023          INTERPRETATION:  AP chest on 2020 3/19/2015 p.m. Patient is   suspect for CHF.    Heart magnified by technique.    There are mild lower lung infiltrates slightly greater on the left that   may represent CHF.    Chest is similar to July 15, 2022.    IMPRESSION: CHF type findings are again suggested.    --- End of Report ---    < end of copied text >    Advanced directives addressed: full resuscitation

## 2023-04-21 NOTE — CONSULT NOTE ADULT - ASSESSMENT
88 year old female PMHx significant for diastolic CHF (HFpEF) (2DECHO from 1/2023 revealed a LVEF 66%, moderate LVH, moderate left atrial dilatation, normal wall motion. Mild aortic stenosis, moderate to severe mitral regurgitation. Mild pulmonary hypertension), severe left ICA stenosis Hypertension, and Hyperlipidemia who presents to the [facility] for further evaluation and management of c/o intermittent chest pain, shortness of breath, anf dyspnea on exertion. Additionally per the patient's daughter at the bedside the patient was evaluated on 4/1/2023 in the [facility] for management of a cut to her the LMF distal phalanx while chopping onions. The patient had her finger reportedly dressed tightly to stop the bleeding, however, the following day she noticed it was "purple" so she went back to on 4/7 where they undressed the finger and wrapped it more loosely. The patient reportedly returned on 4/8 to the ED requiring cauterization of the finger with silver nitrate. Several days ago the daughter noticed the finger was turning black so she came back to ED for further evaluation.  Vital signs in triage => /51, HR 64/min, RR 17/min, TMax 98.2'F, SpO2 97% on room air. Labs => ESR 72, Hgb/Hct 8.2/24.8, Na 127, BUN/Cr 81/1.95, TnI (-) x 1, proBNP 12710, UA (+). In the ED the patient was given Furosemide 20mg IVP x 1.     1. L distal phalanx necrosis   - no evidence of superimposed infection  - observe off antibiotics  - plan for amputation per ortho  - fu cbc  - monitor temps  - supportive care    2. other issues - care per medicine  88 year old female PMHx significant for diastolic CHF (HFpEF) (2DECHO from 1/2023 revealed a LVEF 66%, moderate LVH, moderate left atrial dilatation, normal wall motion. Mild aortic stenosis, moderate to severe mitral regurgitation. Mild pulmonary hypertension), severe left ICA stenosis Hypertension, and Hyperlipidemia who presents to the [facility] for further evaluation and management of c/o intermittent chest pain, shortness of breath, anf dyspnea on exertion. Additionally per the patient's daughter at the bedside the patient was evaluated on 4/1/2023 in the [facility] for management of a cut to her the LMF distal phalanx while chopping onions. The patient had her finger reportedly dressed tightly to stop the bleeding, however, the following day she noticed it was "purple" so she went back to on 4/7 where they undressed the finger and wrapped it more loosely. The patient reportedly returned on 4/8 to the ED requiring cauterization of the finger with silver nitrate. Several days ago the daughter noticed the finger was turning black so she came back to ED for further evaluation.  Vital signs in triage => /51, HR 64/min, RR 17/min, TMax 98.2'F, SpO2 97% on room air. Labs => ESR 72, Hgb/Hct 8.2/24.8, Na 127, BUN/Cr 81/1.95, TnI (-) x 1, proBNP 13772, UA (+). In the ED the patient was given Furosemide 20mg IVP x 1.     1. L distal phalanx necrosis - L 3rd finger   - no evidence of superimposed infection  - observe off antibiotics  - plan for amputation per ortho  - fu cbc  - monitor temps  - supportive care    2. other issues - care per medicine

## 2023-04-22 ENCOUNTER — TRANSCRIPTION ENCOUNTER (OUTPATIENT)
Age: 88
End: 2023-04-22

## 2023-04-22 VITALS — HEART RATE: 65 BPM | SYSTOLIC BLOOD PRESSURE: 129 MMHG | DIASTOLIC BLOOD PRESSURE: 45 MMHG

## 2023-04-22 LAB
ANION GAP SERPL CALC-SCNC: 4 MMOL/L — LOW (ref 5–17)
BUN SERPL-MCNC: 68 MG/DL — HIGH (ref 7–23)
CALCIUM SERPL-MCNC: 9 MG/DL — SIGNIFICANT CHANGE UP (ref 8.5–10.1)
CHLORIDE SERPL-SCNC: 103 MMOL/L — SIGNIFICANT CHANGE UP (ref 96–108)
CO2 SERPL-SCNC: 24 MMOL/L — SIGNIFICANT CHANGE UP (ref 22–31)
CREAT SERPL-MCNC: 1.67 MG/DL — HIGH (ref 0.5–1.3)
EGFR: 29 ML/MIN/1.73M2 — LOW
ERYTHROCYTE [SEDIMENTATION RATE] IN BLOOD: 80 MM/HR — HIGH (ref 0–20)
GLUCOSE SERPL-MCNC: 105 MG/DL — HIGH (ref 70–99)
HCT VFR BLD CALC: 23.1 % — LOW (ref 34.5–45)
HGB BLD-MCNC: 7.3 G/DL — LOW (ref 11.5–15.5)
MCHC RBC-ENTMCNC: 25.3 PG — LOW (ref 27–34)
MCHC RBC-ENTMCNC: 31.6 GM/DL — LOW (ref 32–36)
MCV RBC AUTO: 80.2 FL — SIGNIFICANT CHANGE UP (ref 80–100)
PLATELET # BLD AUTO: 147 K/UL — LOW (ref 150–400)
POTASSIUM SERPL-MCNC: 4.2 MMOL/L — SIGNIFICANT CHANGE UP (ref 3.5–5.3)
POTASSIUM SERPL-SCNC: 4.2 MMOL/L — SIGNIFICANT CHANGE UP (ref 3.5–5.3)
RBC # BLD: 2.88 M/UL — LOW (ref 3.8–5.2)
RBC # FLD: 14.9 % — HIGH (ref 10.3–14.5)
SODIUM SERPL-SCNC: 131 MMOL/L — LOW (ref 135–145)
WBC # BLD: 8.19 K/UL — SIGNIFICANT CHANGE UP (ref 3.8–10.5)
WBC # FLD AUTO: 8.19 K/UL — SIGNIFICANT CHANGE UP (ref 3.8–10.5)

## 2023-04-22 PROCEDURE — 99239 HOSP IP/OBS DSCHRG MGMT >30: CPT

## 2023-04-22 RX ORDER — FUROSEMIDE 40 MG
1 TABLET ORAL
Qty: 30 | Refills: 0
Start: 2023-04-22 | End: 2023-05-21

## 2023-04-22 RX ADMIN — Medication 50 MILLIGRAM(S): at 13:24

## 2023-04-22 RX ADMIN — FAMOTIDINE 10 MILLIGRAM(S): 10 INJECTION INTRAVENOUS at 13:22

## 2023-04-22 RX ADMIN — ATENOLOL 50 MILLIGRAM(S): 25 TABLET ORAL at 13:23

## 2023-04-22 RX ADMIN — CLOPIDOGREL BISULFATE 75 MILLIGRAM(S): 75 TABLET, FILM COATED ORAL at 08:30

## 2023-04-22 RX ADMIN — Medication 40 MILLIGRAM(S): at 08:30

## 2023-04-22 RX ADMIN — AMLODIPINE BESYLATE 10 MILLIGRAM(S): 2.5 TABLET ORAL at 05:27

## 2023-04-22 NOTE — PROGRESS NOTE ADULT - SUBJECTIVE AND OBJECTIVE BOX
CURRENT CARDIAC WORKUP:       Echo:  Stress Test:  Cardiac Cath:    Allergies:   No Known Allergies      MEDICATIONS  (STANDING):  amLODIPine   Tablet 10 milliGRAM(s) Oral daily  atenolol  Tablet 50 milliGRAM(s) Oral daily  clopidogrel Tablet 75 milliGRAM(s) Oral daily  famotidine    Tablet 10 milliGRAM(s) Oral daily  hydrALAZINE 50 milliGRAM(s) Oral two times a day  simvastatin 10 milliGRAM(s) Oral at bedtime    MEDICATIONS  (PRN):      ROS:     .ros      Vital Signs Last 24 Hrs  T(C): 36.9 (2023 08:06), Max: 37.3 (2023 20:56)  T(F): 98.5 (2023 08:06), Max: 99.2 (2023 20:56)  HR: 67 (2023 08:06) (60 - 67)  BP: 129/40 (2023 08:06) (129/40 - 136/40)  BP(mean): --  RR: 18 (2023 08:06) (18 - 18)  SpO2: 100% (2023 08:06) (96% - 100%)    Parameters below as of 2023 08:06  Patient On (Oxygen Delivery Method): room air        I&O's Summary      PHYSICAL EXAM:    .phy      TELEMETRY:    ECG:    LABS:                        7.3    8.19  )-----------( 147      ( 2023 06:40 )             23.1     04-    131<L>  |  103  |  68<H>  ----------------------------<  105<H>  4.2   |  24  |  1.67<H>    Ca    9.0      2023 06:40  Mg     2.2         TPro  8.3  /  Alb  3.5  /  TBili  0.4  /  DBili  x   /  AST  39<H>  /  ALT  32  /  AlkPhos  76  04-        04-20 @ 21:00  Trop-I 35.84  CK     --        PT/INR - ( 2023 21:00 )   PT: 11.9 sec;   INR: 1.03 ratio         PTT - ( 2023 21:00 )  PTT:24.6 sec  Urinalysis Basic - ( 2023 21:55 )    Color: Yellow / Appearance: Clear / S.010 / pH: x  Gluc: x / Ketone: Negative  / Bili: Negative / Urobili: Negative   Blood: x / Protein: 15 / Nitrite: Negative   Leuk Esterase: Small / RBC: 3-5 /HPF / WBC 26-50 /HPF   Sq Epi: x / Non Sq Epi: x / Bacteria: Occasional            RADIOLOGY & ADDITIONAL STUDIES:

## 2023-04-22 NOTE — DISCHARGE NOTE PROVIDER - NSDCMRMEDTOKEN_GEN_ALL_CORE_FT
amLODIPine 10 mg oral tablet: 1 tab(s) orally once a day  atenolol 50 mg oral tablet: 1 tab(s) orally once a day  hydrALAZINE 50 mg oral tablet: 1 tab(s) orally 2 times a day  Lasix 40 mg oral tablet: 1 tab(s) orally once a day  meloxicam 15 mg oral tablet: 1 tab(s) orally once a day  Pepcid 20 mg oral tablet: 1 tab(s) orally 2 times a day  Plavix 75 mg oral tablet: 1 tab(s) orally once a day  simvastatin 10 mg oral tablet: 1 tab(s) orally once a day patient daughter is unsure about the dose

## 2023-04-22 NOTE — DISCHARGE NOTE PROVIDER - NSDCPNSUBOBJ_GEN_ALL_CORE
VITALS:  T(F): 98.5 (04-22-23 @ 08:06), Max: 99.2 (04-21-23 @ 20:56)  HR: 65 (04-22-23 @ 13:20) (60 - 67)  BP: 129/45 (04-22-23 @ 13:20) (129/40 - 136/40)  RR: 18 (04-22-23 @ 08:06) (18 - 18)  SpO2: 100% (04-22-23 @ 08:06) (96% - 100%)    PHYSICAL EXAM:  Gen: NAD  HEENT:  pupils equal and reactive, EOMI, no oropharyngeal lesions, erythema, exudates, oral thrush  NECK:   supple, no carotid bruits, no palpable lymph nodes, no thyromegaly  CV:  +S1, +S2, regular, no murmurs or rubs  RESP:   lungs clear to auscultation bilaterally, no wheezing, rales, rhonchi, good air entry bilaterally  BREAST:  not examined  GI:  abdomen soft, non-tender, non-distended, normal BS, no bruits, no abdominal masses, no palpable masses  RECTAL:  not examined  :  not examined  MSK:   normal muscle tone, no atrophy, no rigidity, no contractions  EXT:  no clubbing, no cyanosis, no edema, no calf pain, swelling or erythema  VASCULAR:  pulses equal and symmetric in the upper and lower extremities  NEURO:  AAOX3, no focal neurological deficits, follows all commands, able to move extremities spontaneously  SKIN:  no ulcers, lesions or rashes    BCx NG    Additional results/Imaging, I have personally reviewed:  CXR 4/20/23: CHF    L hand xray 4/20/23: No gross cortical destruction or soft tissue emphysema

## 2023-04-22 NOTE — DISCHARGE NOTE PROVIDER - CARE PROVIDER_API CALL
Karel Padilla)  Internal Medicine  [address] [place] [address]  Phone: [phone]  Fax: [phone]  Follow Up Time: 1 week    Mariza Gerard)  Cardiology; Interventional Cardiology  [address] [facility] [place] [address]  Phone: [phone]  Fax: [phone]  Follow Up Time: 1 week    Stanley Espinoza)  Orthopaedic Surgery; Surgery of the Hand  [address] [place] [address]  Phone: [phone]  Fax: [phone]  Follow Up Time: 1-3 days

## 2023-04-22 NOTE — DISCHARGE NOTE PROVIDER - PROVIDER TOKENS
PROVIDER:[TOKEN:[[id number]],FOLLOWUP:[1 week]],PROVIDER:[TOKEN:[[id number]],FOLLOWUP:[1 week]],PROVIDER:[TOKEN:[[id number]],FOLLOWUP:[1-3 days]]

## 2023-04-22 NOTE — PROGRESS NOTE ADULT - SUBJECTIVE AND OBJECTIVE BOX
Patient seen and examined at bedside. Poor historian given history of dementia. Patient denies any numbness, tingling, weakness, or any other orthopaedic complaint.     Exam:     Gen: resting in bed, NAD  General: NAD, Awake and Alert  L hand  necrotic eschar over left middle finger distal phalanx  no sensation at fingertip over eschar  SILT proximal to necrotic site  Able to make full fist and extend fingers  AIN/PIN/U intact  cap refill <2 sec proximal to necrotic site      Assessment/Plan:  88y Female with necrosis of LMF distal phalanx    -local wound care to be performed by ortho team  -will require surgical amputation which is nonurgent at this time, if patient is still in the hospital next week will re-evaluate for surgery  -No acute orthopaedic surgical intervention indicated at this time. This patient is orthopaedically stable for discharge planning.   -If discharged before next week, patient to follow up with Dr. Espinoza as an outpatient for further evaluation and management.   -FU medical/cardio optimization, please document  -dressed with dry dressing  -FU BCx: NGTD, ESR/CRP: 72/36  -Pain control as needed  -DVT ppx per primary team  -WBAT L hand  -discussed with Dr. Espinoza who agrees with plan Patient seen and examined at bedside. Poor historian given history of dementia. Patient denies any numbness, tingling, weakness, or any other orthopaedic complaint.     Exam:     Gen: resting in bed, NAD  General: NAD, Awake and Alert  L hand  necrotic eschar over left middle finger distal phalanx  no sensation at fingertip over eschar  SILT proximal to necrotic site  Able to make full fist and extend fingers  AIN/PIN/U intact  cap refill <2 sec proximal to necrotic site      Assessment/Plan:  88y Female with necrosis of LMF distal phalanx    -local wound care to be performed by ortho team  -will require surgical amputation which is nonurgent at this time, if patient is still in the hospital next week will re-evaluate for surgery  -No acute orthopaedic surgical intervention indicated at this time. This patient is orthopaedically stable for discharge planning.   -If discharged before next week, patient to follow up with Dr. Espinoza as an outpatient for further evaluation and management. Call office to make appointment for Monday  -FU medical/cardio optimization, please document  -dressed with dry dressing  -FU BCx: NGTD, ESR/CRP: 72/36  -Pain control as needed  -DVT ppx per primary team  -WBAT L hand  -discussed with Dr. Espinoza who agrees with plan

## 2023-04-22 NOTE — PROGRESS NOTE ADULT - REASON FOR ADMISSION
Chest, pain, shortness of breath, left middle finger distal phalanx necrosis

## 2023-04-22 NOTE — DISCHARGE NOTE PROVIDER - HOSPITAL COURSE
weakness of arms and legs b/l since 945am. slowed speech with R facial droop. code stroke initiated at 1228
CC: SOB  HPI and Hospital course: 88 year old female PMHx significant for diastolic CHF (HFpEF) (2DECHO from 1/2023 revealed a LVEF 66%, moderate LVH, moderate left atrial dilatation, normal wall motion. Mild aortic stenosis, moderate to severe mitral regurgitation. Mild pulmonary hypertension), severe left ICA stenosis (decision was made to continue medical management given her age and risk factors for surgical intervention), Hypertension, and Hyperlipidemia who presents to the [facility] for further evaluation and management of c/o intermittent chest pain, shortness of breath, anf dyspnea on exertion which began yesterday. Additionally per the patient's daughter at the bedside the patient was evaluated on 4/1/2023 in the [facility] for management of a cut to her the LMF distal phalanx while chopping onions. The patient had her finger reportedly dressed tightly to stop the bleeding, however, the following day she noticed it was "purple" so she went back to on 4/7 where they undressed the finger and wrapped it more loosely. The patient reportedly returned on 4/8 to the ED requiring cauterization of the finger with silver nitrate. Several days ago the daughter noticed the finger was turning black so she came back to ED for further evaluation.     #Acute on Chronic CHF (HFpEF)  -on RA  -tele  -trop neg  -BNP 11K  -had echo 1/23  -lasix 40iv qd inpt, d/c on lasix 40mg po qd  -f/u cards recs (Moustapha)    #Necrosis of LMF distal phalanx  -ESR elevated  -BCx NG  -appreciate ortho input, for nonemergent amputation, Dr. Espinoza, should call on Monday for appt  -pt is medically optimized for amputation, pending cardiology clearance  -appreciate ID input, stop abx, no evidence of superimposed infection, will monitor off abx    #HTN  -norvasc, atenlolol, hydral    #Severe left ICA stenosis  -plavix    #?CKD  -stable    #hypoNa  -improving    #DVT ppx- SCDs    Updated daughter at bedside    F2F  I have spent 40 min on day of d/c coordinating care.

## 2023-04-22 NOTE — DISCHARGE NOTE PROVIDER - NSDCCPCAREPLAN_GEN_ALL_CORE_FT
PRINCIPAL DISCHARGE DIAGNOSIS  Diagnosis: CHF exacerbation  Assessment and Plan of Treatment: Take lasix 40mg daily per Dr. Gerard.      SECONDARY DISCHARGE DIAGNOSES  Diagnosis: Gangrene of finger of left hand  Assessment and Plan of Treatment: Call orthopedic office on Monday to schedule amputation.

## 2023-04-22 NOTE — DISCHARGE NOTE PROVIDER - NSDCHOSPICE_GEN_A_CORE
p/t arrived from [place] 10 days ago, c/o of fever, cough, body aches for few days, c/o of mild sob for past 2 days No

## 2023-04-22 NOTE — DISCHARGE NOTE PROVIDER - NSDCCAREPROVSEEN_GEN_ALL_CORE_FT
Mariza Gerard (cardiology)  Ifrah Patel (ID)  Brittni Hinson (\[facility]\"" medicine)

## 2023-04-26 DIAGNOSIS — E78.5 HYPERLIPIDEMIA, UNSPECIFIED: ICD-10-CM

## 2023-04-26 DIAGNOSIS — I96 GANGRENE, NOT ELSEWHERE CLASSIFIED: ICD-10-CM

## 2023-04-26 DIAGNOSIS — E83.42 HYPOMAGNESEMIA: ICD-10-CM

## 2023-04-26 DIAGNOSIS — I13.0 HYPERTENSIVE HEART AND CHRONIC KIDNEY DISEASE WITH HEART FAILURE AND STAGE 1 THROUGH STAGE 4 CHRONIC KIDNEY DISEASE, OR UNSPECIFIED CHRONIC KIDNEY DISEASE: ICD-10-CM

## 2023-04-26 DIAGNOSIS — I65.22 OCCLUSION AND STENOSIS OF LEFT CAROTID ARTERY: ICD-10-CM

## 2023-04-26 DIAGNOSIS — N18.9 CHRONIC KIDNEY DISEASE, UNSPECIFIED: ICD-10-CM

## 2023-04-26 DIAGNOSIS — Z79.02 LONG TERM (CURRENT) USE OF ANTITHROMBOTICS/ANTIPLATELETS: ICD-10-CM

## 2023-04-26 DIAGNOSIS — I50.33 ACUTE ON CHRONIC DIASTOLIC (CONGESTIVE) HEART FAILURE: ICD-10-CM

## 2023-04-26 LAB
CULTURE RESULTS: SIGNIFICANT CHANGE UP
CULTURE RESULTS: SIGNIFICANT CHANGE UP
SPECIMEN SOURCE: SIGNIFICANT CHANGE UP
SPECIMEN SOURCE: SIGNIFICANT CHANGE UP

## 2023-04-27 PROBLEM — I50.32 CHRONIC DIASTOLIC (CONGESTIVE) HEART FAILURE: Chronic | Status: ACTIVE | Noted: 2023-04-21

## 2023-05-22 PROBLEM — Z00.00 ENCOUNTER FOR PREVENTIVE HEALTH EXAMINATION: Status: ACTIVE | Noted: 2023-05-22

## 2023-06-20 ENCOUNTER — APPOINTMENT (OUTPATIENT)
Dept: NEPHROLOGY | Facility: CLINIC | Age: 88
End: 2023-06-20
Payer: MEDICAID

## 2023-06-20 ENCOUNTER — LABORATORY RESULT (OUTPATIENT)
Age: 88
End: 2023-06-20

## 2023-06-20 VITALS
WEIGHT: 105 LBS | BODY MASS INDEX: 22.65 KG/M2 | HEIGHT: 57 IN | TEMPERATURE: 98.2 F | SYSTOLIC BLOOD PRESSURE: 124 MMHG | DIASTOLIC BLOOD PRESSURE: 86 MMHG

## 2023-06-20 DIAGNOSIS — M15.9 POLYOSTEOARTHRITIS, UNSPECIFIED: ICD-10-CM

## 2023-06-20 DIAGNOSIS — Z87.11 PERSONAL HISTORY OF PEPTIC ULCER DISEASE: ICD-10-CM

## 2023-06-20 DIAGNOSIS — I50.9 HEART FAILURE, UNSPECIFIED: ICD-10-CM

## 2023-06-20 PROCEDURE — 99205 OFFICE O/P NEW HI 60 MIN: CPT

## 2023-06-20 RX ORDER — TORSEMIDE 20 MG/1
20 TABLET ORAL
Refills: 0 | Status: ACTIVE | COMMUNITY

## 2023-06-20 RX ORDER — FAMOTIDINE 20 MG/1
20 TABLET, FILM COATED ORAL
Refills: 0 | Status: ACTIVE | COMMUNITY

## 2023-06-20 RX ORDER — AMLODIPINE BESYLATE 10 MG/1
10 TABLET ORAL
Refills: 0 | Status: ACTIVE | COMMUNITY

## 2023-06-20 RX ORDER — HYDRALAZINE HYDROCHLORIDE 50 MG/1
50 TABLET ORAL
Refills: 0 | Status: ACTIVE | COMMUNITY

## 2023-06-20 RX ORDER — ATENOLOL 50 MG/1
50 TABLET ORAL
Refills: 0 | Status: ACTIVE | COMMUNITY

## 2023-06-20 RX ORDER — CLOPIDOGREL BISULFATE 75 MG/1
75 TABLET, FILM COATED ORAL
Refills: 0 | Status: ACTIVE | COMMUNITY

## 2023-06-20 RX ORDER — SIMVASTATIN 20 MG/1
20 TABLET, FILM COATED ORAL
Refills: 0 | Status: ACTIVE | COMMUNITY

## 2023-06-20 NOTE — PHYSICAL EXAM
[General Appearance - Alert] : alert [General Appearance - In No Acute Distress] : in no acute distress [Sclera] : the sclera and conjunctiva were normal [Neck Appearance] : the appearance of the neck was normal [] : no respiratory distress [Respiration, Rhythm And Depth] : normal respiratory rhythm and effort [Auscultation Breath Sounds / Voice Sounds] : lungs were clear to auscultation bilaterally [Apical Impulse] : the apical impulse was normal [Heart Sounds] : normal S1 and S2 [Murmurs] : no murmurs [Edema] : there was no peripheral edema [No CVA Tenderness] : no ~M costovertebral angle tenderness

## 2023-06-20 NOTE — HISTORY OF PRESENT ILLNESS
[FreeTextEntry1] : here for persistent elevation of her scr.  In april 2023 presented to  with chf and with already scr elevation in 1.6-1.8 range.  was dc on diuretics and now is taking tosemide 20 mg qd with 10lb loss with imp sob.  DIL attributes scr elevation to diuretics.  \par at that hosp also had necrosis of left middle phalynx \par denies hx of ckd noted by PMD as per pt's DIL \par anemia present with hgb in 7 in April \par has OA and has been taking ibuprofen and meloxicam recently\par no melena noted \par but does feel dizziness. \par pt declines colonscopy and mammograms\par \par \par Pmhx/psurg hx\par - HTN \par - Carotid stenosis 80% non surgical candidate/ vascular at [facility]\par - PUD on H2 B\par - s/p cataract surgery\par - OA on ibuprofen and meloxicam daily for many years\par

## 2023-06-20 NOTE — ASSESSMENT
[FreeTextEntry1] : - Elevated scr ? ckd hx \par - chf \par - anemia \par - HTN \par - Carotid stenosis 80% non surgical candidate/ vascular at [facility]\par - PUD on H2 B\par - s/p cataract surgery\par - OA on ibuprofen and meloxicam daily for many years\par \par REC\par - bw and urine to evaluate elevated scr and anemia \par - cw current anti htn \par - maintain off nsaid and tylenol for pain \par - pt denies any melena, fu trend of h/h today \par - US kidney\par - fu 3 weeks\par - on FR of 1500 ml by cardiology \par - updated echo report \par

## 2023-06-30 LAB
ALBUMIN MFR SERPL ELPH: 46.7 %
ALBUMIN SERPL-MCNC: 3.7 G/DL
ALBUMIN/GLOB SERPL: 0.9 RATIO
ALBUPE: 14.6 %
ALPHA1 GLOB MFR SERPL ELPH: 4.7 %
ALPHA1 GLOB SERPL ELPH-MCNC: 0.4 G/DL
ALPHA1UPE: 41.5 %
ALPHA2 GLOB MFR SERPL ELPH: 10.6 %
ALPHA2 GLOB SERPL ELPH-MCNC: 0.8 G/DL
ALPHA2UPE: 18.4 %
ANA PAT FLD IF-IMP: ABNORMAL
ANA SER IF-ACNC: ABNORMAL
APPEARANCE: CLEAR
B-GLOBULIN MFR SERPL ELPH: 13.4 %
B-GLOBULIN SERPL ELPH-MCNC: 1.1 G/DL
BACTERIA: NEGATIVE /HPF
BETAUPE: 15.5 %
BILIRUBIN URINE: NEGATIVE
BLOOD URINE: NEGATIVE
C3 SERPL-MCNC: 106 MG/DL
C4 SERPL-MCNC: 33 MG/DL
CAST: 2 /LPF
COLOR: YELLOW
CREAT SPEC-SCNC: 34 MG/DL
CREAT SPEC-SCNC: 34 MG/DL
CREAT/PROT UR: 0.2 RATIO
CRP SERPL-MCNC: <3 MG/L
DEPRECATED CARDIOLIPIN IGA SER: 6.8 APL
DEPRECATED KAPPA LC FREE/LAMBDA SER: 1.51 RATIO
EPITHELIAL CELLS: 1 /HPF
ERYTHROCYTE [SEDIMENTATION RATE] IN BLOOD BY WESTERGREN METHOD: 81 MM/HR
GAMMA GLOB FLD ELPH-MCNC: 2 G/DL
GAMMA GLOB MFR SERPL ELPH: 24.6 %
GAMMAUPE: 10 %
GBM AB TITR SER IF: <0.2
GLUCOSE QUALITATIVE U: NEGATIVE MG/DL
IGA 24H UR QL IFE: NORMAL
INTERPRETATION SERPL IEP-IMP: NORMAL
KAPPA LC 24H UR QL: NORMAL
KAPPA LC CSF-MCNC: 6.9 MG/DL
KAPPA LC SERPL-MCNC: 10.43 MG/DL
KETONES URINE: NEGATIVE MG/DL
LEUKOCYTE ESTERASE URINE: NEGATIVE
M PROTEIN SPEC IFE-MCNC: NORMAL
MICROALBUMIN 24H UR DL<=1MG/L-MCNC: <1.2 MG/DL
MICROALBUMIN/CREAT 24H UR-RTO: NORMAL MG/G
MICROSCOPIC-UA: NORMAL
NITRITE URINE: NEGATIVE
PH URINE: 6.5
PROT PATTERN 24H UR ELPH-IMP: NORMAL
PROT SERPL-MCNC: 8 G/DL
PROT SERPL-MCNC: 8 G/DL
PROT UR-MCNC: 7 MG/DL
PROTEIN URINE: NEGATIVE MG/DL
RED BLOOD CELLS URINE: 1 /HPF
RHEUMATOID FACT SER QL: <10 IU/ML
SPECIFIC GRAVITY URINE: 1.01
STREP DNASE B TITR SER: 96 U/ML
UROBILINOGEN URINE: 0.2 MG/DL
WHITE BLOOD CELLS URINE: 0 /HPF

## 2023-07-25 ENCOUNTER — APPOINTMENT (OUTPATIENT)
Dept: NEPHROLOGY | Facility: CLINIC | Age: 88
End: 2023-07-25
Payer: MEDICAID

## 2023-07-25 VITALS
DIASTOLIC BLOOD PRESSURE: 74 MMHG | HEIGHT: 57 IN | BODY MASS INDEX: 22.65 KG/M2 | SYSTOLIC BLOOD PRESSURE: 118 MMHG | TEMPERATURE: 98.1 F | WEIGHT: 105 LBS

## 2023-07-25 DIAGNOSIS — R79.89 OTHER SPECIFIED ABNORMAL FINDINGS OF BLOOD CHEMISTRY: ICD-10-CM

## 2023-07-25 PROCEDURE — 99214 OFFICE O/P EST MOD 30 MIN: CPT

## 2023-07-25 RX ORDER — MELOXICAM 15 MG/1
15 TABLET ORAL
Refills: 0 | Status: DISCONTINUED | COMMUNITY
End: 2023-07-25

## 2023-07-25 NOTE — ASSESSMENT
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

## 2023-07-25 NOTE — PHYSICAL EXAM
[General Appearance - Alert] : alert [General Appearance - In No Acute Distress] : in no acute distress [Neck Appearance] : the appearance of the neck was normal [] : no respiratory distress [Respiration, Rhythm And Depth] : normal respiratory rhythm and effort [Auscultation Breath Sounds / Voice Sounds] : lungs were clear to auscultation bilaterally [Apical Impulse] : the apical impulse was normal [Heart Sounds] : normal S1 and S2 [Murmurs] : no murmurs [Edema] : there was no peripheral edema [FreeTextEntry1] : trace edema

## 2023-07-25 NOTE — HISTORY OF PRESENT ILLNESS
[FreeTextEntry1] : here for persistent elevation of her scr.  In april 2023 presented to  with chf and with already scr elevation in 1.6-1.8 range.  was dc on diuretics and now is taking tosemide 20 mg qd with 10lb loss with imp sob.  DIL attributes scr elevation to diuretics.  \par at that hosp also had necrosis of left middle phalynx \par denies hx of ckd noted by PMD as per pt's DIL \par anemia present with hgb in 7 in April \par has OA and has been taking ibuprofen and meloxicam recently\par no melena noted \par but does feel dizziness. \par pt declines colonscopy and mammograms\par \par \par Pmhx/psurg hx\par - HTN \par - Carotid stenosis 80% non surgical candidate/ vascular at [facility]\par - PUD on H2 B\par - s/p cataract surgery\par - OA on ibuprofen and meloxicam daily for many years\par \par 7/25/23 \par here to discuss results, has been doing well\par sob when drinks excess fluids \par weight has stabilized with the weight loss and minimal le edema\par no nsaid use since being here last \par

## 2023-10-07 ENCOUNTER — LABORATORY RESULT (OUTPATIENT)
Age: 88
End: 2023-10-07

## 2023-10-11 ENCOUNTER — APPOINTMENT (OUTPATIENT)
Dept: NEPHROLOGY | Facility: CLINIC | Age: 88
End: 2023-10-11
Payer: MEDICAID

## 2023-10-11 VITALS
SYSTOLIC BLOOD PRESSURE: 148 MMHG | DIASTOLIC BLOOD PRESSURE: 62 MMHG | HEIGHT: 57 IN | TEMPERATURE: 98 F | RESPIRATION RATE: 16 BRPM | OXYGEN SATURATION: 97 % | HEART RATE: 74 BPM | WEIGHT: 102 LBS | BODY MASS INDEX: 22.01 KG/M2

## 2023-10-11 DIAGNOSIS — N18.31 CHRONIC KIDNEY DISEASE, STAGE 3A: ICD-10-CM

## 2023-10-11 DIAGNOSIS — N14.19 NEPHROPATHY INDUCED BY OTHER DRUGS, MEDICAMENTS AND BIOLOGICAL SUBSTANCES: ICD-10-CM

## 2023-10-11 DIAGNOSIS — E87.6 HYPOKALEMIA: ICD-10-CM

## 2023-10-11 DIAGNOSIS — I10 ESSENTIAL (PRIMARY) HYPERTENSION: ICD-10-CM

## 2023-10-11 DIAGNOSIS — T39.395A NEPHROPATHY INDUCED BY OTHER DRUGS, MEDICAMENTS AND BIOLOGICAL SUBSTANCES: ICD-10-CM

## 2023-10-11 PROCEDURE — 99214 OFFICE O/P EST MOD 30 MIN: CPT

## 2023-10-11 RX ORDER — HYDRALAZINE HYDROCHLORIDE 25 MG/1
25 TABLET ORAL 3 TIMES DAILY
Qty: 270 | Refills: 1 | Status: ACTIVE | COMMUNITY
Start: 2023-10-11 | End: 1900-01-01

## 2023-10-11 RX ORDER — ASPIRIN 81 MG
81 TABLET, DELAYED RELEASE (ENTERIC COATED) ORAL DAILY
Refills: 0 | Status: ACTIVE | COMMUNITY

## 2023-11-20 NOTE — H&P ADULT - NSVTERISKREFERASSESS_GEN_ALL_CORE
4 PAGE(S)  TO/FROM Sinan Monahan PA-C at [facility]  WITH medical report       [x] Received   [] Given   [] Faxed   [] Mailed  for provider [x] Dr. Garcia    [] Dr. Antony   [] Dr. Richards   [] Dr. Osman   [] Nubia Schulz NP   []  Provider -     Does parent need a copy of form, or do they want it mailed, picked up or faxed?       [x]  PLACED IN PROVIDERS FOLDER AT CHECK IN 3  []  PLACED IN PROVIDERS BASKET  []  PLACED IN [facility] BASKET  []  PLACED IN REFERRAL SPECIALIST BASKET   []  PLACED IN X-RAY OFFICE  []  OTHER      
Refer to the Assessment tab to view/cancel completed assessment.

## 2024-01-18 ENCOUNTER — LABORATORY RESULT (OUTPATIENT)
Age: 89
End: 2024-01-18

## 2024-01-24 ENCOUNTER — APPOINTMENT (OUTPATIENT)
Dept: NEPHROLOGY | Facility: CLINIC | Age: 89
End: 2024-01-24
Payer: MEDICAID

## 2024-01-24 VITALS
HEIGHT: 57 IN | DIASTOLIC BLOOD PRESSURE: 60 MMHG | OXYGEN SATURATION: 96 % | WEIGHT: 100 LBS | HEART RATE: 56 BPM | TEMPERATURE: 96 F | BODY MASS INDEX: 21.57 KG/M2 | SYSTOLIC BLOOD PRESSURE: 158 MMHG

## 2024-01-24 VITALS — DIASTOLIC BLOOD PRESSURE: 56 MMHG | SYSTOLIC BLOOD PRESSURE: 140 MMHG

## 2024-01-24 PROCEDURE — 99213 OFFICE O/P EST LOW 20 MIN: CPT

## 2024-01-24 PROCEDURE — G2211 COMPLEX E/M VISIT ADD ON: CPT | Mod: NC,1L

## 2024-01-24 NOTE — HISTORY OF PRESENT ILLNESS
[FreeTextEntry1] : here with DIL  pt had declined admission for hgb of 7 down from 9  " she does not want anyone else blood" family to speak with pmd about dnr/dni.  palliative/hospice evaluation limited by insurance  no sob, no le edema

## 2024-01-24 NOTE — REASON FOR VISIT
[Follow-Up] : a follow-up visit [Family Member] : family member [FreeTextEntry1] : CKD stage 3, htn , hypokalemia, anemia

## 2024-01-24 NOTE — PHYSICAL EXAM
[General Appearance - Alert] : alert [General Appearance - In No Acute Distress] : in no acute distress [Sclera] : the sclera and conjunctiva were normal [Neck Appearance] : the appearance of the neck was normal [] : no respiratory distress [Respiration, Rhythm And Depth] : normal respiratory rhythm and effort [Auscultation Breath Sounds / Voice Sounds] : lungs were clear to auscultation bilaterally [Heart Rate And Rhythm] : heart rate was normal and rhythm regular [Heart Sounds] : normal S1 and S2 [Murmurs] : no murmurs [Edema] : there was no peripheral edema [No CVA Tenderness] : no ~M costovertebral angle tenderness

## 2024-01-24 NOTE — ASSESSMENT
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

## 2024-08-19 NOTE — H&P ADULT - VTE RISK ASSESSMENT
Patient with a h/o chronic benzodiazepine use  Using Klonopin  2mg daily   Denies H/o benzo withdrawal seizures  Withdrawal management under SEWS protocol as above as patient is no longer interested in continuing   Recommend f/u with OP Psychiatry for continued psychiatric care  Consult case management/CRS for assistance with aftercare resources - pt interested in inpatient drug and alcohol after discharge  Encourage cessation of benzodiazepine use    <<--- Click to launch